# Patient Record
Sex: FEMALE | Race: WHITE | Employment: UNEMPLOYED | URBAN - METROPOLITAN AREA
[De-identification: names, ages, dates, MRNs, and addresses within clinical notes are randomized per-mention and may not be internally consistent; named-entity substitution may affect disease eponyms.]

---
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
## 2021-06-16 ENCOUNTER — HOSPITAL ENCOUNTER (OUTPATIENT)
Dept: PHYSICAL THERAPY | Age: 21
Discharge: HOME | End: 2021-06-16
Payer: COMMERCIAL

## 2021-06-16 PROCEDURE — 97110 THERAPEUTIC EXERCISES: CPT

## 2021-06-16 PROCEDURE — 97530 THERAPEUTIC ACTIVITIES: CPT

## 2021-06-16 PROCEDURE — 97161 PT EVAL LOW COMPLEX 20 MIN: CPT

## 2021-06-16 NOTE — PROGRESS NOTES
PHYSICAL THERAPY - DAILY TREATMENT NOTE    Patient Name: Farideh Kearns        Date: 2021  : 2000   yes Patient  Verified  Visit #:   1     Insurance: Payor: Christie Earing / Plan: Doll Ingles / Product Type: HMO /      In time: 3260 Out time: 6002   Total Treatment Time: 35     Medicare/[facility] Time Tracking (below)   Total Timed Codes (min):  23 1:1 Treatment Time:  35     TREATMENT AREA =  Right elbow pain    SUBJECTIVE  Pain Level (on 0 to 10 scale):  0  / 10   Medication Changes/New allergies or changes in medical history, any new surgeries or procedures?    no  If yes, update Summary List   Subjective Functional Status/Changes:  []  No changes reported   See Eval for subjective information and c/o. OBJECTIVE  10 min Therapeutic Exercise:  [x]  See flow sheet; Kinesiotape- space correction technique applied to the R elbow. Patient was educated on Kinesiotape precautions, indications, contraindications, and instructed to remove if irritation/rash/redness/pain increases or develops. Patient with verbalized understanding of all. Rationale:      increase ROM and increase strength to improve the patients ability to perform activities and decrease pain with movement. 13 min Therapeutic Activity: [x]  See flow sheet  Body part R elbow  Biomechanics of injury; proper sleeping positions using pillows; avoidance of aggravating factors; short lever arm with lifting activities    Rationale:    decrease pressure on the right elbow to  improve the patients ability to Tolerate basic ADLs and job-related tasks without pain.        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 Progress toward goals / Updated goals:  Pt denied sharp pain or red flags with initial eval or therapeutic ex. See initial eval. HEP administered.       PLAN  []  Upgrade activities as tolerated yes Continue plan of care   []  Discharge due to :    []  Other:      Therapist: Bijan Lopez PT    Date: 6/16/2021 Time: 10:34 AM     Future Appointments   Date Time Provider Nabeel Gordon   6/16/2021 11:15 AM [address]

## 2021-06-16 NOTE — PROGRESS NOTES
[facility] PHYSICAL THERAPY  [address] [place] [address] 51, [address],[place], [address] [facility] - Phone: [phone]  Fax: [phone] / [facility]  Patient Name: Howard Stearns : 2000   Medical   Diagnosis: Right elbow pain Treatment Diagnosis: Right elbow pain   Onset Date: 1.5 months     Referral Source: Pedro Fenton Start of [facility]): 2021   Prior Hospitalization: See medical history Provider #: [id number]   Prior Level of Function: No tingling/numbness in 4th/5th digits of the right hand   Comorbidities: Depression, IBS, HTN, asthma   Medications: Verified on Patient Summary List   The Plan of Care and following information is based on the information from the initial evaluation.   ========================================================================  Assessment / key information: Patient is a RHD 22 y.o. female who presents to [facility] with a diagnosis of right elbow pain. Patient is her own historian. 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 Patient presents with chief complaint of tingling/numbness along the right ulnar nerve distribution which is localized to her 4th and 5th digits. Tingling is of sudden onset 1.5 months ago and patient unsure as to PHILL. As per subjective history intake, patient said that she \"sleeps with her right hand under her forehead and/or under her two pillows, my arm is bent. \" Had patient demonstrate position and she was lying in prone with right elbow bent at 90 degrees with hand on forehead. Tingling and numbness in the 4th and 5th digits are greatest upon waking and decrease throughout the day and return the following morning. Tingling also occurs when reading a book while sitting. No c/o weakness/dropping items. No c/o neck pain. Denies radicular s/s from the C-S. Xrays unremarkable.  No evidence of clawing of the 4th/5th digits and no evidence of atrophy at the hypothenar eminence. Current Deficits include: pain, decreased mobility, decreased strength, and decreased postural awareness with resulting limitations in ADL's and in functional abilities. Patient will benefit from a comprehensive POC/HEP to address impairments and restore function in order to return to prior level of function and prevent secondary impairments. Impairments are as follows:   Pain: current pain level 0/10, pain level at worst 2/10, and pain level at best 0/10 TTP along right medial epicondyle  Posture reverse C-S with mild for head and rounded shoulders Observations + increased cubital angle of R elbow>L  AROM/PROM (in degrees unless otherwise specified) C-S, R GH joint, R elbow, and R wrist all WFLs and pain free  Strength grossly 4+/5 throughout RUE  Special Tests + Tinels at the ulnar nerve RUE; + ULTT for R ulnar nerve; DTRs WFLs; RUE myotomes and dermatomes intact  Functional Deficits include: see aforementioned. Patient's FOTO score was a 74/100 indicating decreased function.  Patient will benefit from a POC addressing such impairments and limitations in order to improve quality of life and return to PLOF.    ========================================================================  Eval Complexity: History: HIGH Complexity :3+ comorbidities / personal factors will impact the outcome/ POC Exam:MEDIUM Complexity : 3 Standardized tests and measures addressing body structure, function, activity limitation and / or participation in recreation  Presentation: LOW Complexity : Stable, uncomplicated  Clinical Decision Making:MEDIUM Complexity : FOTO score of 26-74Overall Complexity:LOW   Problem List: pain affecting function, decrease ROM, decrease strength, edema affecting function, impaired gait/ balance, decrease ADL/ functional abilitiies, decrease activity tolerance, decrease flexibility/ joint mobility and decrease transfer abilities   Treatment Plan may include any combination of the following: Therapeutic exercise, Therapeutic activities, Neuromuscular re-education, Physical agent/modality, Gait/balance training, Manual therapy, Aquatic therapy, Patient education, Self Care training, Functional mobility training, Home safety training and Stair training  Patient / Family readiness to learn indicated by: asking questions  Persons(s) to be included in education: patient (P)  Barriers to Learning/Limitations: None  Measures taken: A HEP was initiated to assist with POC in restoring function   Patient Goal (s): \"numbness and tingling to go away\"   Patient self reported health status: good  Rehabilitation Potential: excellent  Short Term Goals: To be accomplished in 2 treatments: 1. Goal: Patient will be independent and compliant with HEP in order to progress toward long term goals. Status at last note/certification: issued and reviewed    Long Term Goals: To be accomplished in 8-12 treatments: 1. Goal: Patient will improve FOTO assessment score to 81 pts in order to indicate improved functional abilities. Status at last note/certification: 74  2. Goal: Patient will demonstrate a negative ULTT for 2 consecutive sessions for the RUE ulnar nerve in order to improve neurodynamic mobility and assist with function         Status at last note/certification: + ULTT   3. Goal: Patient will demonstrate a negative tinels test at the right elbow on 2 consecutive occasions in order to assist with a reduction of nerve entrapment and improve function         Status at last note/certification: + tinels tap test at the right elbow  4. Goal: Patient will report the ability to fall asleep without being in prone, RUE bent at 90 degrees, with hand on forehead, in order to decrease risk of future impairments caused by repetitive entrapment/stress to the ulnar nerve              Status at last note/certification: NA    Frequency / Duration: Patient to be seen  1-2  times per week for 8-12  treatments:  Patient / Caregiver education and instruction: exercises    Therapist Signature: Matheus April, PT Date: 5/87/0675   Certification Period:  Time: 10:33 AM   ========================================================================  I certify that the above Physical Therapy Services are being furnished while the patient is under my care. I agree with the treatment plan and certify that this therapy is necessary. Physician Signature:    _____  Date:     Time:______  Piper Henderson PA-C  Please sign and return to [facility]. or you may fax the signed copy to [phone]. Thank you.

## 2021-06-18 ENCOUNTER — APPOINTMENT (OUTPATIENT)
Dept: PHYSICAL THERAPY | Age: 21
End: 2021-06-18
Payer: COMMERCIAL

## 2021-06-25 ENCOUNTER — HOSPITAL ENCOUNTER (OUTPATIENT)
Dept: PHYSICAL THERAPY | Age: 21
Discharge: HOME | End: 2021-06-25
Payer: COMMERCIAL

## 2021-06-25 PROCEDURE — 97140 MANUAL THERAPY 1/> REGIONS: CPT

## 2021-06-25 PROCEDURE — 97112 NEUROMUSCULAR REEDUCATION: CPT

## 2021-06-25 PROCEDURE — 97110 THERAPEUTIC EXERCISES: CPT

## 2021-06-25 NOTE — PROGRESS NOTES
PHYSICAL THERAPY - DAILY TREATMENT NOTE    Patient Name: Brooks Pappas        Date: 2021  : 2000   yes Patient  Verified  Visit #:   2   of   12  Insurance: Payor: Taylor Roman / Plan: Catherine Howard / Product Type: HMO /      In time: 044 Out time: 1010   Total Treatment Time: 55     Medicare/[employer] Time Tracking (below)   Total Timed Codes (min):  na 1:1 Treatment Time:  na     TREATMENT AREA =  Lesion of ulnar nerve, right upper limb [G56.21]    SUBJECTIVE  Pain Level (on 0 to 10 scale):  0  / 10   Medication Changes/New allergies or changes in medical history, any new surgeries or procedures?    no  If yes, update Summary List   Subjective Functional Status/Changes:  []  No changes reported     Did do the HEP. No questions about the HEP. Reports tingling pin in digits 4 and 5 of (R) hand. OBJECTIVE  Modalities Rationale:     decrease inflammation and decrease pain to improve patient's ability to perform general ADLs with decrease c/o symptoms.    min [] Estim, type/location:                                      []  att     []  unatt     []  w/US     []  w/ice    []  w/heat    min []  Mechanical Traction: type/lbs                   []  pro   []  sup   []  int   []  cont    []  before manual    []  after manual    min []  Ultrasound, settings/location:      min []  Iontophoresis w/ dexamethasone, location:                                               []  take home patch       []  in clinic   10 min [x]  Ice     []  Heat    location/position: R elbow    min []  Vasopneumatic Device, press/temp:    If using vaso (only need to measure limb vaso being performed on)      pre-treatment girth :       post-treatment girth :       measured at (landmark location) :      min []  Other:    [x] Skin assessment post-treatment (if applicable):    [x]  intact    []  redness- no adverse reaction                  []redness  adverse reaction:      25 min Therapeutic Exercise:  [x]  See flow sheet Rationale:      increase ROM and increase strength to improve the patients ability to perform general ADLs with decrease c/o symptoms. 10 min Manual Therapy: MFR/TPR medial head of triceps using extension of wrist, flexor carpi ulnaris using radial deviation to refugio. Rationale:      decrease pain, increase ROM and increase tissue extensibility to improve patient's ability to perform general ADLs with decrease c/o symptoms. The manual therapy interventions were performed at a separate and distinct time from the therapeutic activities interventions. 10 min Neuromuscular Re-ed: [x]  See flow sheet   Rationale:    increase ROM and increase strength to improve the patients ability to perform general ADLs with decrease c/o symptoms. Billed With/As:   [x] TE   [] TA   [] Neuro   [] Self Care Patient Education: [x] Review HEP    [] Progressed/Changed HEP based on:   [] positioning   [] body mechanics   [] transfers   [] heat/ice application    [] other:      Other Objective/Functional Measures:    No change in functional measurements today. Post Treatment Pain Level (on 0 to 10) scale:   5  / 10     ASSESSMENT  Assessment/Changes in Function:     Overall good tolerance to all therapeutic interventions for first treatment session. []  See Progress Note/Recertification   Patient will continue to benefit from skilled PT services to modify and progress therapeutic interventions, address ROM deficits, address strength deficits, analyze and address soft tissue restrictions and analyze and cue movement patterns to attain remaining goals. Progress toward goals / Updated goals:    No change toward goals today.      PLAN  []  Upgrade activities as tolerated yes Continue plan of care   []  Discharge due to :    []  Other:      Therapist: Rachael Tyler PTA    Date: 6/25/2021 Time: 6:11 AM     Future Appointments   Date Time Provider Nabeel Godron   6/25/2021  9:15 AM Sasha Kilpatrick PTA [place] [id number] 6/30/2021 10:30 AM [facility]   7/2/2021 10:15 AM [facility]   7/7/2021 10:15 AM [facility]   7/9/2021 10:15 AM Denis Garcia, [address]

## 2021-06-30 ENCOUNTER — HOSPITAL ENCOUNTER (OUTPATIENT)
Dept: PHYSICAL THERAPY | Age: 21
Discharge: HOME | End: 2021-06-30
Payer: COMMERCIAL

## 2021-06-30 PROCEDURE — 97110 THERAPEUTIC EXERCISES: CPT

## 2021-06-30 PROCEDURE — 97112 NEUROMUSCULAR REEDUCATION: CPT

## 2021-06-30 PROCEDURE — 97140 MANUAL THERAPY 1/> REGIONS: CPT

## 2021-06-30 NOTE — PROGRESS NOTES
PHYSICAL THERAPY - DAILY TREATMENT NOTE    Patient Name: Lenny Rdz        Date: 2021  : 2000   yes Patient  Verified  Visit #:   3   of   12  Insurance: Payor: Suleiman Banegas / Plan: Andreia Oats / Product Type: HMO /      In time: 8655 Out time: 94   Total Treatment Time: 57     Medicare/[employer] Time Tracking (below)   Total Timed Codes (min):  na 1:1 Treatment Time:  na     TREATMENT AREA =  Lesion of ulnar nerve, right upper limb [G56.21]    SUBJECTIVE  Pain Level (on 0 to 10 scale):  1  / 10   Medication Changes/New allergies or changes in medical history, any new surgeries or procedures?    no  If yes, update Summary List   Subjective Functional Status/Changes:  []  No changes reported     Pt continues to have tingling and pain in the 4th and 5th digits of the right hand. She is wearing the right elbow brace during the day, but has pain. She continues to have pain with sleeping and tingling secondary to UE positioning. OBJECTIVE  Modalities Rationale:     decrease inflammation and decrease pain to improve patient's ability to perform general ADLs with decrease c/o symptoms.    min [] Estim, type/location:                                      []  att     []  unatt     []  w/US     []  w/ice    []  w/heat    min []  Mechanical Traction: type/lbs                   []  pro   []  sup   []  int   []  cont    []  before manual    []  after manual    min []  Ultrasound, settings/location:      min []  Iontophoresis w/ dexamethasone, location:                                               []  take home patch       []  in clinic   10 min [x]  Ice     []  Heat    location/position: R elbow    min []  Vasopneumatic Device, press/temp:    If using vaso (only need to measure limb vaso being performed on)      pre-treatment girth :       post-treatment girth :       measured at (landmark location) :      min []  Other:    [x] Skin assessment post-treatment (if applicable):    [x]  intact    [] redness- no adverse reaction                  []redness  adverse reaction:      20 min Therapeutic Exercise:  [x]  See flow sheet Kinesiotape- inhibitory Y technique applied to the R Fleoxr carpi ulnaris; facilitory I to the R triceps; donut hole to the medial epicondyle. Patient was educated on Kinesiotape precautions, indications, contraindications, and instructed to remove if irritation/rash/redness/pain increases or develops. Patient with verbalized understanding of all. Rationale:      increase ROM and increase strength to improve the patients ability to perform general ADLs with decrease c/o symptoms. 15 min Manual Therapy: MFR/TPR to the right pec minor/major, medial head of triceps, flexor carpi ulnaris   Rationale:      decrease pain, increase ROM and increase tissue extensibility to improve patient's ability to perform general ADLs with decrease c/o symptoms. The manual therapy interventions were performed at a separate and distinct time from the therapeutic activities interventions. 12 min Neuromuscular Re-ed: [x]  See flow sheet; RI technique to the right FCU and ulnar nerve glide sequence RUE   Rationale:    increase ROM and increase strength  to improve the patients ability to perform activities with good form and proprioception with tactile and verbal cuing appropriately in order to develop conscious control of individual muscles and awareness of position of extremities. Billed With/As:   [x] TE   [] TA   [] Neuro   [] Self Care Patient Education: [x] Review HEP    [] Progressed/Changed HEP based on:   [] positioning   [] body mechanics   [] transfers   [] heat/ice application    [] other:      Other Objective/Functional Measures:    No change in functional measurements today.      Post Treatment Pain Level (on 0 to 10) scale:   0  / 10     ASSESSMENT  Assessment/Changes in Function:   Pt instructed to don right elbow brace at night when sleeping to avoid sleeping with her R elbow flexed and under her head and to discontinue donning it throughout the day. []  See Progress Note/Recertification   Patient will continue to benefit from skilled PT services to modify and progress therapeutic interventions, address ROM deficits, address strength deficits, analyze and address soft tissue restrictions and analyze and cue movement patterns to attain remaining goals.    Progress toward goals / Updated goals:    Progressing to STG #1     PLAN  [x]  Upgrade activities as tolerated yes Continue plan of care   []  Discharge due to :    []  Other:      Therapist: Cuong Monique PT    Date: 6/30/2021 Time: 6:11 AM     Future Appointments   Date Time Provider Nabeel Gordon   7/2/2021 10:15 AM Ney Imam [facility]   7/7/2021 10:15 AM Shanae Found, [address] [address]   7/9/2021 10:15 AM Shanae Found, PTA [place] [address]

## 2021-07-02 ENCOUNTER — HOSPITAL ENCOUNTER (OUTPATIENT)
Dept: PHYSICAL THERAPY | Age: 21
Discharge: HOME | End: 2021-07-02
Payer: COMMERCIAL

## 2021-07-02 PROCEDURE — 97110 THERAPEUTIC EXERCISES: CPT

## 2021-07-02 PROCEDURE — 97140 MANUAL THERAPY 1/> REGIONS: CPT

## 2021-07-02 NOTE — PROGRESS NOTES
PHYSICAL THERAPY - DAILY TREATMENT NOTE    Patient Name: Emir Atwood        Date: 2021  : 2000   yes Patient  Verified  Visit #:     Insurance: Payor: Estella Enriquez / Plan: Nic Carbon / Product Type: HMO /      In time: 6488 Out time: 1110   Total Treatment Time: 50     Medicare/[employer] Time Tracking (below)   Total Timed Codes (min):  40 1:1 Treatment Time:       TREATMENT AREA =  Lesion of ulnar nerve, right upper limb [G56.21]    SUBJECTIVE  Pain Level (on 0 to 10 scale):  1  / 10   Medication Changes/New allergies or changes in medical history, any new surgeries or procedures?    no  If yes, update Summary List   Subjective Functional Status/Changes:  []  No changes reported     Pt reporting tingling in the (R) hand, but also feels it running down the arm, through the elbow. OBJECTIVE  Modalities Rationale:     decrease inflammation and decrease pain to improve patient's ability to perform pain free ADLs. min [] Estim, type/location:                                      []  att     []  unatt     []  w/US     []  w/ice    []  w/heat    min []  Mechanical Traction: type/lbs                   []  pro   []  sup   []  int   []  cont    []  before manual    []  after manual    min []  Ultrasound, settings/location:      min []  Iontophoresis w/ dexamethasone, location:                                               []  take home patch       []  in clinic   10 min [x]  Ice     []  Heat    location/position: (R) elbow, supine.      min []  Vasopneumatic Device, press/temp:    If using vaso (only need to measure limb vaso being performed on)      pre-treatment girth :       post-treatment girth :       measured at (landmark location) :      min []  Other:    [x] Skin assessment post-treatment (if applicable):    [x]  intact    []  redness- no adverse reaction                  []redness  adverse reaction:      25 min Therapeutic Exercise:  [x]  See flow sheet   Rationale: increase ROM and increase strength to improve the patients ability to perform pain free ADLs. 15 Min Manual Therapy: STM/DTM (R) UT/LS, pec major/min, wrist flexors. Rationale:      decrease pain, increase ROM, increase tissue extensibility and decrease trigger points to improve patient's ability to perform pain free ADLs. The manual therapy interventions were performed at a separate and distinct time from the therapeutic activities interventions. Billed With/As:   [x] TE   [] TA   [] Neuro   [] Self Care Patient Education: [x] Review HEP    [] Progressed/Changed HEP based on:   [] positioning   [] body mechanics   [] transfers   [] heat/ice application    [] other:      Other Objective/Functional Measures: Therex per flow sheet. Post Treatment Pain Level (on 0 to 10) scale:   0  / 10     ASSESSMENT  Assessment/Changes in Function:     Pt reporting no pain, but increase in tingling in the elbow. []  See Progress Note/Recertification   Patient will continue to benefit from skilled PT services to modify and progress therapeutic interventions, address functional mobility deficits, address ROM deficits, address strength deficits, analyze and address soft tissue restrictions, analyze and cue movement patterns, analyze and modify body mechanics/ergonomics and assess and modify postural abnormalities to attain remaining goals. Progress toward goals / Updated goals:    No change in progress toward LTG's with today's session.       PLAN  [x]  Upgrade activities as tolerated yes Continue plan of care   []  Discharge due to :    []  Other:      Therapist: Noralee Landau, PTA    Date: 7/2/2021 Time: 1:33 PM     Future Appointments   Date Time Provider Nabeel Gordon   7/7/2021 10:15 AM Kate Miles [facility]   7/9/2021 10:15 AM Misha Khan PTA [place] [address]

## 2021-07-07 ENCOUNTER — HOSPITAL ENCOUNTER (OUTPATIENT)
Dept: PHYSICAL THERAPY | Age: 21
Discharge: HOME | End: 2021-07-07
Payer: COMMERCIAL

## 2021-07-07 PROCEDURE — 97140 MANUAL THERAPY 1/> REGIONS: CPT

## 2021-07-07 PROCEDURE — 97110 THERAPEUTIC EXERCISES: CPT

## 2021-07-07 NOTE — PROGRESS NOTES
PHYSICAL THERAPY - DAILY TREATMENT NOTE    Patient Name: Lizzy eJtt        Date: 2021  : 2000   yes Patient  Verified  Visit #:     Insurance: Payor: Bettina Peraza / Plan: Kenneth Estrella / Product Type: HMO /      In time:  Out time:    Total Treatment Time: 55     Medicare/[employer]BS Time Tracking (below)   Total Timed Codes (min):  45 1:1 Treatment Time:       TREATMENT AREA =  Lesion of ulnar nerve, right upper limb [G56.21]    SUBJECTIVE  Pain Level (on 0 to 10 scale):  1  / 10   Medication Changes/New allergies or changes in medical history, any new surgeries or procedures?    no  If yes, update Summary List   Subjective Functional Status/Changes:  []  No changes reported     Pt reporting some tingling this am but it has  down significantly. OBJECTIVE  Modalities Rationale:     decrease inflammation and decrease pain to improve patient's ability to perform pain free ADLs. min [] Estim, type/location:                                      []  att     []  unatt     []  w/US     []  w/ice    []  w/heat    min []  Mechanical Traction: type/lbs                   []  pro   []  sup   []  int   []  cont    []  before manual    []  after manual    min []  Ultrasound, settings/location:      min []  Iontophoresis w/ dexamethasone, location:                                               []  take home patch       []  in clinic   10 min [x]  Ice     []  Heat    location/position: (R) elbow in supine.      min []  Vasopneumatic Device, press/temp:    If using vaso (only need to measure limb vaso being performed on)      pre-treatment girth :       post-treatment girth :       measured at (landmark location) :      min []  Other:    [x] Skin assessment post-treatment (if applicable):    [x]  intact    []  redness- no adverse reaction                  []redness  adverse reaction:      30 min Therapeutic Exercise:  [x]  See flow sheet   Rationale:      increase ROM and increase strength to improve the patients ability to perform pain free ADLs. 15 Min Manual Therapy: STM/DTM (R) UT/LS, pec major/min, wrist flexors. Rationale:      decrease pain, increase ROM, increase tissue extensibility and decrease trigger points to improve patient's ability to perform pain free ADLs. The manual therapy interventions were performed at a separate and distinct time from the therapeutic activities interventions. Billed With/As:   [x] TE   [] TA   [] Neuro   [] Self Care Patient Education: [x] Review HEP    [] Progressed/Changed HEP based on:   [] positioning   [] body mechanics   [] transfers   [] heat/ice application    [] other:      Other Objective/Functional Measures: Therex per flow sheet. Post Treatment Pain Level (on 0 to 10) scale:   0  / 10     ASSESSMENT  Assessment/Changes in Function:     No exacerbation of symptoms with today's sessoin. []  See Progress Note/Recertification   Patient will continue to benefit from skilled PT services to modify and progress therapeutic interventions, address functional mobility deficits, address ROM deficits, address strength deficits, analyze and address soft tissue restrictions, analyze and cue movement patterns, analyze and modify body mechanics/ergonomics and assess and modify postural abnormalities to attain remaining goals. Progress toward goals / Updated goals:     No change in progress toward LTG's with today's session.       PLAN  [x]  Upgrade activities as tolerated yes Continue plan of care   []  Discharge due to :    []  Other:      Therapist: Reid Barth PTA    Date: 7/7/2021 Time: 10:21 AM     Future Appointments   Date Time Provider Nabeel Gordon   7/9/2021 10:15 AM Molly Martinez PTA [facility] [address]

## 2021-07-09 ENCOUNTER — HOSPITAL ENCOUNTER (OUTPATIENT)
Dept: PHYSICAL THERAPY | Age: 21
Discharge: HOME | End: 2021-07-09
Payer: COMMERCIAL

## 2021-07-09 PROCEDURE — 97110 THERAPEUTIC EXERCISES: CPT

## 2021-07-09 PROCEDURE — 97140 MANUAL THERAPY 1/> REGIONS: CPT

## 2021-07-09 NOTE — PROGRESS NOTES
PHYSICAL THERAPY - DAILY TREATMENT NOTE    Patient Name: Emir Atwood        Date: 2021  : 2000   yes Patient  Verified  Visit #:     Insurance: Payor: Estella Enriquez / Plan: Nic Carbon / Product Type: HMO /      In time: 278 Out time: 1110   Total Treatment Time: 55     Medicare/[employer]BS Time Tracking (below)   Total Timed Codes (min):  45 1:1 Treatment Time:       TREATMENT AREA =  Lesion of ulnar nerve, right upper limb [G56.21]    SUBJECTIVE  Pain Level (on 0 to 10 scale):  1  / 10   Medication Changes/New allergies or changes in medical history, any new surgeries or procedures?    no  If yes, update Summary List   Subjective Functional Status/Changes:  []  No changes reported     Pt states she had a sharp pain in the elbow yesterday, it didn't travel down to her hand. OBJECTIVE  Modalities Rationale:     decrease inflammation and decrease pain to improve patient's ability to perform pain free ADLs. min [] Estim, type/location:                                      []  att     []  unatt     []  w/US     []  w/ice    []  w/heat    min []  Mechanical Traction: type/lbs                   []  pro   []  sup   []  int   []  cont    []  before manual    []  after manual    min []  Ultrasound, settings/location:      min []  Iontophoresis w/ dexamethasone, location:                                               []  take home patch       []  in clinic   10 min [x]  Ice     []  Heat    location/position: (R) elbow in supine.      min []  Vasopneumatic Device, press/temp:    If using vaso (only need to measure limb vaso being performed on)      pre-treatment girth :       post-treatment girth :       measured at (landmark location) :      min []  Other:    [x] Skin assessment post-treatment (if applicable):    [x]  intact    []  redness- no adverse reaction                  []redness  adverse reaction:      30 min Therapeutic Exercise:  [x]  See flow sheet   Rationale:      increase ROM and increase strength to improve the patients ability to perform pain free ADLs. 15 Min Manual Therapy: STM/DTM (R) UT/LS, pec major/min, wrist flexors. KT tape - inhibitory Y (R) flexor carpi ulnaris, facilitory I to (R) triceps, donut hole to medial epicondyle. Rationale:      decrease pain, increase ROM, increase tissue extensibility and decrease trigger points to improve patient's ability to perform pain free ADLs. The manual therapy interventions were performed at a separate and distinct time from the therapeutic activities interventions. Billed With/As:   [x] TE   [] TA   [] Neuro   [] Self Care Patient Education: [x] Review HEP    [] Progressed/Changed HEP based on:   [] positioning   [] body mechanics   [] transfers   [] heat/ice application    [] other:      Other Objective/Functional Measures: Therex per flow sheet. Post Treatment Pain Level (on 0 to 10) scale:   0  / 10     ASSESSMENT  Assessment/Changes in Function:     No exacerbation of symptoms with today's session. []  See Progress Note/Recertification   Patient will continue to benefit from skilled PT services to modify and progress therapeutic interventions, address functional mobility deficits, address ROM deficits, address strength deficits, analyze and address soft tissue restrictions, analyze and cue movement patterns, analyze and modify body mechanics/ergonomics and assess and modify postural abnormalities to attain remaining goals. Progress toward goals / Updated goals:    No change in progress toward LTG's with today's session. PLAN  [x]  Upgrade activities as tolerated yes Continue plan of care   []  Discharge due to :    []  Other:      Therapist: Ishaan Torres PTA    Date: 7/9/2021 Time: 10:32 AM     No future appointments.

## 2021-10-13 NOTE — PROGRESS NOTES
[address] [address] [facility]   [facility] [address], [address], [place], [address] [facility] - Phone: [phone]  Fax: [phone]  DISCHARGE SUMMARY    Patient Name: Dior Leiva : 2000   Treatment/Medical Diagnosis: Lesion of ulnar nerve, right upper limb [G56.21]   Referral Source: Jose Molina PA-C     Date of Initial Visit: 21 Attended Visits: 6 Missed Visits: 0   PROVIDER# [id number]    SUMMARY OF TREATMENT  Skilled physical therapy was provided to the patient and/or family members inclusive of: postural re-education, pain management and symptom reduction using modalities, NM re-education, therapeutic exercise and therapeutic activities. A focus was on implementation of a comprehensive HEP in order to aid in a return to PLOF. CURRENT STATUS  Unable to follow up with patient regarding goals secondary to patient not returning after 21. As per PTA's note on 21, patient had been making good progress to date and with less tingling. RECOMMENDATIONS  Discontinue therapy due to lack of attendance or compliance. If you have any questions/comments please contact us directly at [phone]. Thank you for allowing us to assist in the care of your patient.     Therapist Signature: Jerrell Copeland PT Date:    Certification Period:    Reporting Period: MEDICARE ONLY        Time: 3:20 PM

## 2022-07-29 ENCOUNTER — HOSPITAL ENCOUNTER (OUTPATIENT)
Dept: PHYSICAL THERAPY | Age: 22
Discharge: HOME | End: 2022-07-29
Payer: COMMERCIAL

## 2022-07-29 PROCEDURE — 97161 PT EVAL LOW COMPLEX 20 MIN: CPT

## 2022-07-29 PROCEDURE — 97110 THERAPEUTIC EXERCISES: CPT

## 2022-07-29 PROCEDURE — 97535 SELF CARE MNGMENT TRAINING: CPT

## 2022-07-29 NOTE — PROGRESS NOTES
[facility] PHYSICAL THERAPY  [address] [place] [address], [address],[place], [address] [facility] - Phone: [phone]  Fax: [phone] / [address] [facility]  Patient Name: Dragan Randle : 2000   Medical   Diagnosis: Right knee pain [M25.561] Treatment Diagnosis: R knee pain   Onset Date: 7/15/2022     Referral Source: [facility] Start of Care [facility]): 2022   Prior Hospitalization: See medical history Provider #: [id number]   Prior Level of Function: IND with all ADLs   Comorbidities: Ulnar nerve release , depression   Medications: Verified on Patient Summary List   The Plan of Care and following information is based on the information from the initial evaluation.   ===========================================================================================  Assessment / key information:  Dragan Randle is a 25 y.o. F who presents to skilled PT for the treatment diagnosis of R knee pain. PHILL was jumping at a concert, she jumped and she landed and felt a pop and immediately knew something was wrong. Pt had to be escorted out via ambulance. it swelled a lot, and could not walk on it immediately. Still having trouble walking normally. Notices some ROM restriction with some popping with pain when bending and straightening. Feels best in the morning. Once she gets moving it starts feeling sore. Also notices some calf soreness due to walking. Has been doing one stair at a time. Tried doing it normally and has to swing out to the side. Pt does childcare. Her getting on the floor and playing with the kids is affected. Pt would benefit form skilled PT services addressing the current ROM, strength, functional performance, and balance impairments so that the patient to return to performing all ADLs with minimal restriction/limitation or without any functional limitations.      Pain:  Current: 2/10    Worst: 9/10 initial injury   Best: 1/10    Objective:   FOTO score = 36 (an established functional score where 100 = no disability)    Posture: supine with R LE bent     Swelling/Edema girth measurements (at joint line):   (L) knee - NT  (R) knee - NT    Functional Activity:  Sit to stand - raised table slight weight shift to L knee  Squat - NT    Knee AROM   (L) Flexion: 135 deg     Extension: 3 hyperext                 (R) Flexion: 105 deg   Extension: -10 deg    Knee PROM  R flexion 110 deg no p!  R ext -5 deg      Knee MMT  (L) Flexion: 5/5    Extension: 5/5  (R) Flexion: 4/5    Extension: 5/5    HEP provided to the patient in order to promote improvement and self management of symptoms and functional performance:   Access Code: [email or web address]  URL: [email or web address]  Date: 07/29/2022  Prepared by: Zuhair Grissom    Exercises  Supine Heel Slide with Strap - 2 x daily - 7 x weekly - 10 reps - 5\" hold  Supine Quad Set - 4 x daily - 7 x weekly - 10 reps - 3\" hold  Long Sitting Calf Stretch with Strap - 2 x daily - 7 x weekly - 3 sets - 30\" hold  Bridge on Heels - 1 x daily - 7 x weekly - 3 sets - 10 reps        ===========================================================================================  Eval Complexity: History LOW Complexity : Zero comorbidities / personal factors that will impact the outcome / POC;  Examination  LOW Complexity : 1-2 Standardized tests and measures addressing body structure, function, activity limitation and / or participation in recreation ; Presentation LOW Complexity : Stable, uncomplicated ;  Decision Making MEDIUM Complexity : FOTO score of 26-74; Overall Complexity LOW   Problem List: pain affecting function, decrease ROM, decrease strength, edema affecting function, impaired gait/ balance, decrease ADL/ functional abilitiies, decrease activity tolerance, and decrease flexibility/ joint mobility   Treatment Plan may include any combination of the following: Therapeutic exercise, Therapeutic activities, Neuromuscular re-education, Physical agent/modality, Gait/balance training, Manual therapy, Patient education, Self Care training, Functional mobility training, Home safety training, and Stair training  Patient / Family readiness to learn indicated by: asking questions, trying to perform skills, and interest  Persons(s) to be included in education: patient (P)  Barriers to Learning/Limitations: None  Measures taken, if barriers to learning:    Patient Goal (s): \"Strength, better ROM, stability, and no surgery if possible\"   Patient self reported health status: fair  Rehabilitation Potential: good  Short Term Goals: To be accomplished in  2  weeks. 1) Pt will be IND with HEP to facilitate self care management. 2) Pt will improve L knee ROM to 0-130 deg to order to improve ability to improve ability to negotiate stairs with ease   Long Term Goals: To be accomplished in  6 weeks. 1) Pt will improve worst pain levels from initial evaluation level of 9/10 to 2/10 to show improved QOL and improved overall perception of pain-free/more pain-free function with ADLs. 2) Pt will improve FOTO scores to 68 in order to show detectable change in overall function. 3) Pt will be able to perform stair negotiation with </= 3/10 (within tolerable limits) signifying improvement in overall functional capacity and activity tolerance. Frequency / Duration:   Patient to be seen  2  times per week for 6  weeks. Patient / Caregiver education and instruction: self care, activity modification, and exercises  Therapist Signature: Magaly Mock PT Date: 8/07/1301   Certification Period: NA Time: 7:43 AM   ===========================================================================================  I certify that the above Physical Therapy Services are being furnished while the patient is under my care.   I agree with the treatment plan and certify that this therapy is necessary. Physician Signature:        Date:       Time:                                        Fredy Norman,*  Please sign and return to [facility] at [facility] [place], [facility]. or you may fax the signed copy to [phone]. Thank you.

## 2022-07-29 NOTE — PROGRESS NOTES
PHYSICAL THERAPY - DAILY TREATMENT NOTE    Patient Name: Dragan Randle        Date: 2022  : 2000   YES Patient  Verified  Visit #:   1   of   8  Insurance: Payor: Aishwarya Bkaer / Plan: Debi Ken / Product Type: HMO /      In time: 520 Out time: 3630   Total Treatment Time: 40     Medicare/[facility] Time Tracking (below)   Total Timed Codes (min):  25 1:1 Treatment Time:  40     TREATMENT AREA =  Right knee pain [M25.561]    SUBJECTIVE    Pain Level (on 0 to 10 scale):  2  / 10   Medication Changes/New allergies or changes in medical history, any new surgeries or procedures? NO    If yes, update Summary List   Subjective Functional Status/Changes:  []  No changes reported     See POC          OBJECTIVE  10 min Therapeutic Exercise:  [x]  See flow sheet   Rationale:      increase ROM, increase strength, improve coordination, improve balance, and increase proprioception to improve the patients ability to perform ADLs with decreased symptoms and limitations. 15 min Self Care: Reviewed diagnosis, prognosis, therapy progression  Discussion of ACL surgery and rehab progression and pros and cons of surgery   Rationale:    Improve understanding of injury and therapy to have realistic expectation of therapy to improve compliance/adherence and satisfaction    Billed With/As:   [x] TE   [] TA   [] Neuro   [x] Self Care Patient Education: [x] Review HEP    [] Progressed/Changed HEP based on:   Access Code: [email or web address]  URL: [email or web address]  Date: 2022  Prepared by: Rodena Plate    Exercises  Supine Heel Slide with Strap - 2 x daily - 7 x weekly - 10 reps - 5\" hold  Supine Quad Set - 4 x daily - 7 x weekly - 10 reps - 3\" hold  Long Sitting Calf Stretch with Strap - 2 x daily - 7 x weekly - 3 sets - 30\" hold  Bridge on Heels - 1 x daily - 7 x weekly - 3 sets - 10 reps    [] positioning   [] body mechanics   [] transfers   [x] heat/ice application    [] other: Other Objective/Functional Measures:    Shown and performed HEP     Post Treatment Pain Level (on 0 to 10) scale:   1 / 10     ASSESSMENT  Assessment/Changes in Function:     See POC     []  See Progress Note/Recertification   Patient will continue to benefit from skilled PT services to modify and progress therapeutic interventions, address functional mobility deficits, address ROM deficits, address strength deficits, analyze and address soft tissue restrictions, analyze and cue movement patterns, analyze and modify body mechanics/ergonomics, assess and modify postural abnormalities, and instruct in home and community integration to attain goals per POC.    Progress toward goals / Updated goals:    See POC     PLAN  [x]  Upgrade activities as tolerated YES Continue plan of care   []  Discharge due to :    [x]  Other: 2x per week for 4 weeks     Therapist: Irma Randle PT    Date: 7/29/2022 Time: 7:44 AM

## 2022-08-01 ENCOUNTER — HOSPITAL ENCOUNTER (OUTPATIENT)
Dept: PHYSICAL THERAPY | Age: 22
Discharge: HOME | End: 2022-08-01
Payer: COMMERCIAL

## 2022-08-01 PROCEDURE — 97140 MANUAL THERAPY 1/> REGIONS: CPT

## 2022-08-01 PROCEDURE — 97110 THERAPEUTIC EXERCISES: CPT

## 2022-08-01 NOTE — PROGRESS NOTES
PHYSICAL THERAPY - DAILY TREATMENT NOTE    Patient Name: Carter Allen        Date: 2022  : 2000   yes Patient  Verified  Visit #:   2   of   8  Insurance: Payor: Seferino Yates / Plan: Bridget Nogueira / Product Type: HMO /      In time:  Out time:    Total Treatment Time: 43     Medicare/BCBS Time Tracking (below)   Total Timed Codes (min):  40 1:1 Treatment Time:  43     TREATMENT AREA =  Right knee pain [M25.561]    SUBJECTIVE  Pain Level (on 0 to 10 scale):  0  / 10   Medication Changes/New allergies or changes in medical history, any new surgeries or procedures?    no  If yes, update Summary List   Subjective Functional Status/Changes:  []  No changes reported     Pt reports the initial exercises / HEP have helped her a lot. Feels her ROM is better, however still has pain with straightening and bending. Pt states she can go up stairs one after the other, however one at a time with going down. OBJECTIVE    28 min Therapeutic Exercise:  [x]  See flow sheet   Rationale:      increase ROM and increase strength to improve the patients ability to perform ADL's and functional tasks      10 min Manual Therapy: STM to quad, ITB and gastroc in long sit   Patella mobilizations   Quad stretch in Reilly Padmini position c PT    Rationale:      decrease pain, increase ROM, and increase tissue extensibility to improve patient's ability to perform functional tasks and ADL's  The manual therapy interventions were performed at a separate and distinct time from the therapeutic activities interventions.     5 min Self Care: Continued  on diagnosis, prognosis and importance of regaining strength, ROM and gait mechanics (not billed)   Rationale:     Patient education  to improve the patients ability to self manage symptoms     Billed With/As:   [x] TE   [] TA   [] Neuro   [] Self Care Patient Education: [x] Review HEP    [] Progressed/Changed HEP based on:   [] positioning   [] body mechanics   [] transfers   [] heat/ice application    [] other:      Other Objective/Functional Measures:    Achieved 0-112deg R knee extension today, pt hesitant due to feeling of instability (R knee flexion improved following quad stretching in Penryn Prima position c PT)    Decent gait mechanics, demos ability to ascend stairs in reciprocal fashion, continues step to with descent    Increased exercises per flow sheet      Post Treatment Pain Level (on 0 to 10) scale:   2  / 10     ASSESSMENT  Assessment/Changes in Function:     Pt faired well during session today, improved R knee ROM following manuals and stretching. Reports decreased tightness during ambulation at end of session. Decent TRX STS mechanics today with minimal weight shift to L LE. Pt fatigued at end of session, declined need for CP. Will continue to benefit from skilled PT to address deficits with progressions in strengthening and [employer] tasks as able. []  See Progress Note/Recertification   Patient will continue to benefit from skilled PT services to modify and progress therapeutic interventions, address functional mobility deficits, address ROM deficits, address strength deficits, analyze and address soft tissue restrictions, analyze and cue movement patterns, analyze and modify body mechanics/ergonomics, and assess and modify postural abnormalities to attain remaining goals.    Progress toward goals / Updated goals:    Initiated, first follow up since IE     PLAN  [x]  Upgrade activities as tolerated yes Continue plan of care   []  Discharge due to :    []  Other:      Therapist: Yue Almonte PT    Date: 8/1/2022 Time: 10:05 AM     Future Appointments   Date Time Provider Nabeel Gordon   8/4/2022 11:15 AM Vincent Alfonso, PT [facility]   8/8/2022  3:00 PM Emile Ngo PT [facility]   8/11/2022 11:00 AM Emile Ngo PT [facility]   8/15/2022  2:15 PM Emile Ngo, [address] [facility]   8/18/2022  8:30 AM Vincent Alfonso, [address] [facility] 8/22/2022  7:00 AM Geofm Foot, [address] [facility]   8/24/2022 10:30 AM Abel Argueta PT [facility]

## 2022-08-04 ENCOUNTER — HOSPITAL ENCOUNTER (OUTPATIENT)
Dept: PHYSICAL THERAPY | Age: 22
Discharge: HOME | End: 2022-08-04
Payer: COMMERCIAL

## 2022-08-04 PROCEDURE — 97535 SELF CARE MNGMENT TRAINING: CPT

## 2022-08-04 PROCEDURE — 97140 MANUAL THERAPY 1/> REGIONS: CPT

## 2022-08-04 PROCEDURE — 97110 THERAPEUTIC EXERCISES: CPT

## 2022-08-04 NOTE — PROGRESS NOTES
PHYSICAL THERAPY - DAILY TREATMENT NOTE    Patient Name: Paola Adams        Date: 2022  : 2000   yes Patient  Verified  Visit #:   3   of   8  Insurance: Payor: Garcia Hitchcock / Plan: [employer] / Product Type: HMO /      In time: 1120 Out time:    Total Treatment Time: 55     Medicare/BCBS Time Tracking (below)   Total Timed Codes (min):  45 1:1 Treatment Time:  55     TREATMENT AREA =  Right knee pain [M25.561]    SUBJECTIVE  Pain Level (on 0 to 10 scale):  3  / 10   Medication Changes/New allergies or changes in medical history, any new surgeries or procedures?    no  If yes, update Summary List   Subjective Functional Status/Changes:  []  No changes reported     Pt states she did well after last session, had some soreness however noticed her knee overall felt better with more ROM. Pt has been shopping already today and walking a lot. Has increased her stair attempts, which she feels has made her more sore.            OBJECTIVE  Modalities Rationale:     decrease edema and decrease pain to improve patient's ability to tolerate standing tasks    min [] Estim, type/location:                                      []  att     []  unatt     []  w/US     []  w/ice    []  w/heat    min []  Mechanical Traction: type/lbs                   []  pro   []  sup   []  int   []  cont    []  before manual    []  after manual    min []  Ultrasound, settings/location:      min []  Iontophoresis w/ dexamethasone, location:                                               []  take home patch       []  in clinic   10 min [x]  Ice     []  Heat    location/position: R knee in long sit with towel roll under knee    min []  Vasopneumatic Device, press/temp:    If using vaso (only need to measure limb vaso being performed on)      pre-treatment girth :       post-treatment girth :       measured at (landmark location) :      min []  Other:    [x] Skin assessment post-treatment (if applicable):    [x]  intact    [] redness- no adverse reaction                  []redness - adverse reaction:      20 min Therapeutic Exercise:  [x]  See flow sheet   Rationale:      increase ROM and increase strength to improve the patients ability to perform ADL's and functional tasks      15 min Manual Therapy: STM to quad, ITB and gastroc in long sit  Patella mobilizations  Quad stretch in Jes Guess position c PT   Gastroc stretch    Rationale:      decrease pain, increase ROM, and increase tissue extensibility to improve patient's ability to perform ADL's and functional tasks   The manual therapy interventions were performed at a separate and distinct time from the therapeutic activities interventions. 10 min Self Care: Pt counseled on PT expectations, progress and prognosis. Continued discussion on potential surgical intervention vs. Conservative care. Included hamstring stretching to HEP with print out. Rationale:     Patient education  to improve the patients ability to self manage symptoms     Billed With/As:   [] TE   [] TA   [] Neuro   [x] Self Care Patient Education: [x] Review HEP    [] Progressed/Changed HEP based on:   [] positioning   [] body mechanics   [] transfers   [] heat/ice application    [] other:      Other Objective/Functional Measures:    Pt demos decreased terminal knee extension during stance phases of gait at beginning of session, improved following Rx. Increased TTP to quad, gastroc and hamstring today    Included hamstring stretching and calf raises today without adverse reactions    Updated HEP to include hamstring stretching      Post Treatment Pain Level (on 0 to 10) scale:   0  / 10     ASSESSMENT  Assessment/Changes in Function:     Slightly modified session today due to pt increased reports of soreness following increased standing / ambulation tasks outside of clinic. Continues to demo hesitancy toward end range with R knee.  Improved gait mechanics at end of session, demo'ing improved terminal knee extension during stance phase. CP at end of session to reduce post exercise soreness. Will continue to benefit from skilled PT to address deficits with progressions in strengthening and [employer] tasks as able. []  See Progress Note/Recertification   Patient will continue to benefit from skilled PT services to modify and progress therapeutic interventions, address functional mobility deficits, address ROM deficits, address strength deficits, analyze and address soft tissue restrictions, analyze and cue movement patterns, and analyze and modify body mechanics/ergonomics to attain remaining goals. Progress toward goals / Updated goals:    Short Term Goals: To be accomplished in  2  weeks. 1) Pt will be IND with HEP to facilitate self care management. - Progressing, Pt reports compliance with initial HEP (8/4/22)  2) Pt will improve L knee ROM to 0-130 deg to order to improve ability to improve ability to negotiate stairs with ease   Long Term Goals: To be accomplished in  6 weeks. 1) Pt will improve worst pain levels from initial evaluation level of 9/10 to 2/10 to show improved QOL and improved overall perception of pain-free/more pain-free function with ADLs. 2) Pt will improve FOTO scores to 68 in order to show detectable change in overall function. 3) Pt will be able to perform stair negotiation with </= 3/10 (within tolerable limits) signifying improvement in overall functional capacity and activity tolerance.      PLAN  [x]  Upgrade activities as tolerated yes Continue plan of care   []  Discharge due to :    []  Other:      Therapist: Feliciana Councilman, PT    Date: 8/4/2022 Time: 11:20 AM     Future Appointments   Date Time Provider Nabeel Gordon   8/8/2022  3:00 PM Gabriele Stephenson PT [facility]   8/11/2022 11:00 AM Gabriele Stephenson PT [facility]   8/15/2022  2:15 PM Gabriele Stephenson PT [facility]   8/18/2022  8:30 AM Allen Chavarria, [address] [place] [address] [facility]   8/22/2022  7:00 AM Allen Chavarria, PT [facility]   8/24/2022 10:30 AM Maykel Gamez, PT [facility]

## 2022-08-08 ENCOUNTER — HOSPITAL ENCOUNTER (OUTPATIENT)
Dept: PHYSICAL THERAPY | Age: 22
Discharge: HOME | End: 2022-08-08
Payer: COMMERCIAL

## 2022-08-08 PROCEDURE — 97112 NEUROMUSCULAR REEDUCATION: CPT

## 2022-08-08 PROCEDURE — 97110 THERAPEUTIC EXERCISES: CPT

## 2022-08-08 PROCEDURE — 97140 MANUAL THERAPY 1/> REGIONS: CPT

## 2022-08-08 NOTE — PROGRESS NOTES
PHYSICAL THERAPY - DAILY TREATMENT NOTE    Patient Name: Eliot Bolden        Date: 2022  : 2000   yes Patient  Verified  Visit #:   4   of   8  Insurance: Payor: Layla Sood / Plan: Venkata Romero / Product Type: HMO /      In time: 3:09 PM Out time: 3:57 PM   Total Treatment Time: 48     Medicare/BCBS Time Tracking (below)   Total Timed Codes (min):  38 1:1 Treatment Time:  48     TREATMENT AREA =  Right knee pain [M25.561]    SUBJECTIVE  Pain Level (on 0 to 10 scale):  4  / 10   Medication Changes/New allergies or changes in medical history, any new surgeries or procedures?    no  If yes, update Summary List   Subjective Functional Status/Changes:  []  No changes reported     Patient reports the knee has been hurting more. She will be walking it can feel like it alix and when that happened it hurts. Also feels some grinding in the knee which can be painful. Soreness in the calf and the quad area. OBJECTIVE  10 min Therapeutic Exercise:  [x]  See flow sheet   Rationale:      increase ROM, increase strength, improve coordination, improve balance, and increase proprioception to improve the patients ability to perform general ADLs with decrease c/o symptoms and with improved functional performance. 15 min Manual Therapy: STM to HS, calf/knee ext mobs gr 2-3    Rationale:      decrease pain, increase ROM, increase tissue extensibility, decrease edema , and decrease trigger points to improve patient's ability to perform general ADLs with decrease c/o symptoms and with improved functional performance. The manual therapy interventions were performed at a separate and distinct time from the therapeutic activities interventions.     13 min Neuromuscular Re-ed: [x]  See flow sheet   Rationale:    increase ROM, increase strength, improve coordination, improve balance, and increase proprioception to improve the patients ability to perform general ADLs with decrease c/o symptoms and with improved functional performance. Billed With/As:   [x] TE   [] TA   [] Neuro   [] Self Care Patient Education: [x] Review HEP    [] Progressed/Changed HEP based on:   [] positioning   [] body mechanics   [] transfers   [] heat/ice application    [] other:      Other Objective/Functional Measures:    R knee AROM   deg      Post Treatment Pain Level (on 0 to 10) scale:   0  / 10     ASSESSMENT  Assessment/Changes in Function:     Patient tolerated today's treatment very well. Initially had poor tolerance for extension. After manual, much improved mobility to -2 deg ext. Reinforced with TKE strengthening and re edu via quad setting, SLR, and band TKE. Initiated SLS todayon airex. Good tolerance but decreased stability on R knee requiring UE support to prevent LOB . []  See Progress Note/Recertification   Patient will continue to benefit from skilled PT services to modify and progress therapeutic interventions, address functional mobility deficits, address ROM deficits, address strength deficits, analyze and address soft tissue restrictions, analyze and cue movement patterns, analyze and modify body mechanics/ergonomics, assess and modify postural abnormalities, and instruct in home and community integration to attain remaining goals. Progress toward goals / Updated goals:    Patient is making slow progress towards their goals at this time. Her pain levels are fluctuating from 0-4/10 and ytoday was worse but was able to get back to a 0/10. Variable progress to LTG for pain. All goals remain applicable at this time.      PLAN  [x]  Upgrade activities as tolerated yes Continue plan of care   []  Discharge due to :    []  Other:      Therapist: Magaly Mock PT    Date: 8/8/2022 Time: 10:27 AM     Future Appointments   Date Time Provider Nabeel Gordon   8/8/2022  3:00 PM Milton Quevedo PT [facility]   8/11/2022 11:00 AM [facility]   8/15/2022  2:15 PM Erendira Dawkins [place] [facility]   8/18/2022  8:30 AM Elo Moncada, [facility]   8/22/2022  7:00 AM Elo Moncada, PT [facility]   8/24/2022 10:30 AM Holly Rivera, PT [facility]

## 2022-08-11 ENCOUNTER — HOSPITAL ENCOUNTER (OUTPATIENT)
Dept: PHYSICAL THERAPY | Age: 22
Discharge: HOME | End: 2022-08-11
Payer: COMMERCIAL

## 2022-08-11 PROCEDURE — 97110 THERAPEUTIC EXERCISES: CPT

## 2022-08-11 PROCEDURE — 97112 NEUROMUSCULAR REEDUCATION: CPT

## 2022-08-11 PROCEDURE — 97140 MANUAL THERAPY 1/> REGIONS: CPT

## 2022-08-11 NOTE — PROGRESS NOTES
PHYSICAL THERAPY - DAILY TREATMENT NOTE    Patient Name: Pramod Gonzales        Date: 2022  : 2000   yes Patient  Verified  Visit #:   5   of   8  Insurance: Payor: Yudith Paiz / Plan: Ismael Apple / Product Type: HMO /      In time: 1100  Out time: 1200   Total Treatment Time: 50     Medicare/BCBS Time Tracking (below)   Total Timed Codes (min):  50 1:1 Treatment Time:  60     TREATMENT AREA =  Right knee pain [M25.561]    SUBJECTIVE  Pain Level (on 0 to 10 scale):  6  / 10   Medication Changes/New allergies or changes in medical history, any new surgeries or procedures?    no  If yes, update Summary List   Subjective Functional Status/Changes:  []  No changes reported     Patient reports that it buckled this morning amd it got very irritated. Afte rthe buckel the knee and calf are very sore. Yesterday it was very good. OBJECTIVE  15 min Therapeutic Exercise:  [x]  See flow sheet   Rationale:      increase ROM, increase strength, improve coordination, improve balance, and increase proprioception to improve the patients ability to perform general ADLs with decrease c/o symptoms and with improved functional performance. 20 min Manual Therapy: STM to HS, calf/knee ext mobs gr 2-3    Rationale:      decrease pain, increase ROM, increase tissue extensibility, decrease edema , and decrease trigger points to improve patient's ability to perform general ADLs with decrease c/o symptoms and with improved functional performance. The manual therapy interventions were performed at a separate and distinct time from the therapeutic activities interventions. 15 min Neuromuscular Re-ed: [x]  See flow sheet   Rationale:    increase ROM, increase strength, improve coordination, improve balance, and increase proprioception to improve the patients ability to perform general ADLs with decrease c/o symptoms and with improved functional performance.     Billed With/As:   [x] TE   [] TA   [] Neuro [] Self Care Patient Education: [x] Review HEP    Discussed adding CR to HEP  [] Progressed/Changed HEP based on:   [] positioning   [] body mechanics   [] transfers   [] heat/ice application    [] other:      Other Objective/Functional Measures:    R knee AROM   Ext -6 deg p! Improved  to -1 deg      Post Treatment Pain Level (on 0 to 10) scale:   2 / 10     ASSESSMENT  Assessment/Changes in Function:     Patient tolerated today's treatment well today. Still extension intolerance due to lateral knee pain. Better post manual and HS and calf stretching. Fatgied with SLR but quad setting today. Pt reported to hyperxtension knee buckling. Adding additional knee flexion control exercises today which she tolerated very well. Instructed to focus on bridges on heels and add CR to HEP to assist with knee flexion control      []  See Progress Note/Recertification   Patient will continue to benefit from skilled PT services to modify and progress therapeutic interventions, address functional mobility deficits, address ROM deficits, address strength deficits, analyze and address soft tissue restrictions, analyze and cue movement patterns, analyze and modify body mechanics/ergonomics, assess and modify postural abnormalities, and instruct in home and community integration to attain remaining goals. Progress toward goals / Updated goals:    Patient is making slow progress towards their goals at this time. Knee extension still lagging in mobility but improves post manual and stretching, and still limited ambulatory ability with buckling in knee via hyperextension. . All goals remain applicable at this time.      PLAN  [x]  Upgrade activities as tolerated yes Continue plan of care   []  Discharge due to :    []  Other:      Therapist: Kira Velázquez PT    Date: 8/11/2022 Time: 8:30 AM     Future Appointments   Date Time Provider Nabeel Gordon   8/11/2022 11:00 AM Tc Beyer PT [facility]   8/15/2022  2:15 PM Katie Alejandra, [facility]   8/18/2022  8:30 AM Nathanael Lin, [address] [facility]   8/22/2022  7:00 AM Nathanael Lin, [address] [facility]   8/24/2022 10:30 AM Katie Alejandra, [facility]

## 2022-08-15 ENCOUNTER — HOSPITAL ENCOUNTER (OUTPATIENT)
Dept: PHYSICAL THERAPY | Age: 22
Discharge: HOME | End: 2022-08-15
Payer: COMMERCIAL

## 2022-08-15 PROCEDURE — 97112 NEUROMUSCULAR REEDUCATION: CPT

## 2022-08-15 PROCEDURE — 97110 THERAPEUTIC EXERCISES: CPT

## 2022-08-15 PROCEDURE — 97140 MANUAL THERAPY 1/> REGIONS: CPT

## 2022-08-15 NOTE — PROGRESS NOTES
PHYSICAL THERAPY - DAILY TREATMENT NOTE    Patient Name: Hugh Kaufman        Date: 8/15/2022  : 2000   yes Patient  Verified  Visit #:   6   of   8  Insurance: Payor: Arely Hussein / Plan: Mathew López / Product Type: HMO /      In time: 2:15 Out time: 3:12   Total Treatment Time: 57     Medicare/BCBS Time Tracking (below)   Total Timed Codes (min):  47 1:1 Treatment Time:  57     TREATMENT AREA =  Right knee pain [M25.561]    SUBJECTIVE  Pain Level (on 0 to 10 scale):  0  / 10   Medication Changes/New allergies or changes in medical history, any new surgeries or procedures?    no  If yes, update Summary List   Subjective Functional Status/Changes:  []  No changes reported     Patient reports the knee is not hurting today. Was more careful over the weekend and that helped with the buckling of the knee. She did have some pain rolling over in bed and got a very sharp pain which lasted for like a minute and it went away. Also notes some sharp pain in the lower calf over the weekend. OBJECTIVE  Modalities Rationale:     decrease edema, decrease inflammation, and decrease pain to improve patient's ability to perform ADLs with decreased pain and improved mobility.    min [] Estim, type/location:                                      []  att     []  unatt     []  w/US     []  w/ice    []  w/heat    min []  Mechanical Traction: type/lbs                   []  pro   []  sup   []  int   []  cont    []  before manual    []  after manual    min []  Ultrasound, settings/location:      min []  Iontophoresis w/ dexamethasone, location:                                               []  take home patch       []  in clinic   10 min [x]  Ice     []  Heat    location/position: On incline R knee    min []  Vasopneumatic Device, press/temp:    If using vaso (only need to measure limb vaso being performed on)      pre-treatment girth :       post-treatment girth :       measured at (landmark location) :      min [] Other:    [] Skin assessment post-treatment (if applicable):    []  intact    []  redness- no adverse reaction                  []redness - adverse reaction:      10 min Therapeutic Exercise:  [x]  See flow sheet   Rationale:      increase ROM, increase strength, improve coordination, improve balance, and increase proprioception to improve the patients ability to perform general ADLs with decrease c/o symptoms and with improved functional performance. 20 min Manual Therapy: STM to HS, calf/knee ext mobs gr 2-3    Rationale:      decrease pain, increase ROM, increase tissue extensibility, decrease edema , and decrease trigger points to improve patient's ability to perform general ADLs with decrease c/o symptoms and with improved functional performance. The manual therapy interventions were performed at a separate and distinct time from the therapeutic activities interventions. 17 min Neuromuscular Re-ed: [x]  See flow sheet   Rationale:    increase ROM, increase strength, improve coordination, improve balance, and increase proprioception to improve the patients ability to perform general ADLs with decrease c/o symptoms and with improved functional performance. Billed With/As:   [x] TE   [] TA   [] Neuro   [] Self Care Patient Education: [x] Review HEP    [] Progressed/Changed HEP based on:   [] positioning   [] body mechanics   [] transfers   [] heat/ice application    [] other:      Other Objective/Functional Measures:    R knee AROM   Extension -2 deg slight pain     Post tx ext 0 deg      Post Treatment Pain Level (on 0 to 10) scale:   1  / 10     ASSESSMENT  Assessment/Changes in Function:     Patient tolerated today's treatment well. Better extension mobility at the start of the session. Progressed her CKC activity today which was tolerable. Most pain and difficulty with TKE moblity and strengthening. Did well with the eccentric step downs backwards with good control and obvious fatigue.       [] See Progress Note/Recertification   Patient will continue to benefit from skilled PT services to modify and progress therapeutic interventions, address functional mobility deficits, address ROM deficits, address strength deficits, analyze and address soft tissue restrictions, analyze and cue movement patterns, analyze and modify body mechanics/ergonomics, assess and modify postural abnormalities, and instruct in home and community integration to attain remaining goals. Progress toward goals / Updated goals:       Patient is making slow progress towards their goals at this time. Knee extension was full post manual today, but still painful getting into terminal knee ext, and still limited ambulatory ability with buckling in knee via hyperextension. . All goals remain applicable at this time.      PLAN  [x]  Upgrade activities as tolerated yes Continue plan of care   []  Discharge due to :    []  Other:      Therapist: Chanelle Wellington PT    Date: 8/15/2022 Time: 10:23 AM     Future Appointments   Date Time Provider Nabeel Gordon   8/15/2022  2:15 PM Prince Smith, PT [facility]   8/18/2022  8:30 AM James Rodney, [address] [facility]   8/22/2022  7:00 AM James Rodney, [address]  [facility]   8/24/2022 10:30 AM Prince Smith, [address] [facility]

## 2022-08-18 ENCOUNTER — HOSPITAL ENCOUNTER (OUTPATIENT)
Dept: PHYSICAL THERAPY | Age: 22
Discharge: HOME | End: 2022-08-18
Payer: COMMERCIAL

## 2022-08-18 PROCEDURE — 97110 THERAPEUTIC EXERCISES: CPT

## 2022-08-18 PROCEDURE — 97112 NEUROMUSCULAR REEDUCATION: CPT

## 2022-08-18 PROCEDURE — 97140 MANUAL THERAPY 1/> REGIONS: CPT

## 2022-08-18 NOTE — PROGRESS NOTES
[address] [facility] PHYSICAL THERAPY  [address] [place] [address], [facility],[place], [facility] - Phone: [phone]  Fax: [phone]  PROGRESS NOTE  Patient Name: Paola Adams : 2000   Treatment/Medical Diagnosis: Right knee pain [M25.561]   Referral Source: Ricardo Patel,*     Date of Initial Visit: 22 Attended Visits: 7 Missed Visits: 0     SUMMARY OF TREATMENT  Patient has attended 8 PT sessions, including an initial evaluation for R knee pain s/p ACL rupture on 7/15/22 . PT interventions have included manual therapy, therapeutic exercises, functional activities, neuromuscular reeducation, and HEP to improve R knee ROM, strength, stability and functional tasks. CP used to for pain control and reduce edema. Progress note done today due to pt's follow up with MD tomorrow (22). CURRENT STATUS  Pt has attended ~3 weeks of PT and has made some progress in overall function. Reduced pain ranges from worst 9/10 to 0-5/10, noting most pain occurring with feeling of instability, twisting and buckling. Pt demos decent gait mechanics however challenged with prolonged standing / walking tasks. Tolerates stairs negotiation with reciprocal pattern, use of 1 HA for stability Patient reports waiting for further  / recommendation for surgical intervention to determine next steps. Advised pt to continue with ice usage to reduce symptoms. Current Objective Findings:   Knee AROM: lacking 2 deg - 110 deg   Knee Strength MMT: R knee flexion 4+/5, R knee extension 5/5  SLR with no quad lag present  Flexibility: R hamstring and gastroc mildly impaired   Edema: minimal     Goal/Measure of Progress Goal Met? 1. Pt will be IND with HEP to facilitate self care management. Status at last Eval: Goal established  Current Status: Pt reports I and complaint with initial HEP  yes   2.   Pt will improve L knee ROM to 0-130 deg to order to improve ability to improve ability to negotiate stairs with ease    Status at last Eval: -10-0-105 deg Current Status: 0-110 deg Progressing   3. Pt will be able to perform stair negotiation with </= 3/10 (within tolerable limits) signifying improvement in overall functional capacity and activity tolerance. Status at last Eval: Unable to tolerate on R (step to pattern) Current Status: Reciprocal gait pattern, 1/10 pain reported yes     New Goals to be achieved in __3_  weeks:  1. Pt will improve FOTO scores to 68 in order to show detectable change in overall function. 2. Pt will improve R knee flexion MMT to 5/5 for improved functional tasks   3. Pt will tolerate walking 1 mile with </=2/10 pain in R knee for improved endurance     RECOMMENDATIONS  Pt scheduled for follow up with MD to discuss progress in PT thus far and potential benefits of surgical intervention. Recommending 1-2x/week for 3 weeks to continue to improve R knee ROM, strength, and functional tasks pending MD recommendations on potential surgical intervention. Thank you for this referral.  If you have any questions/comments please contact us directly at [phone]. Thank you for allowing us to assist in the care of your patient. Therapist Signature: Millie Melgar PT Date: 8/18/2022     Time: 8:18 AM   NOTE TO PHYSICIAN:  PLEASE COMPLETE THE ORDERS BELOW AND FAX TO   [facility] Physical Therapy: [phone]  If you are unable to process this request in 24 hours please contact our office: [phone]    ___ I have read the above report and request that my patient continue as recommended.   ___ I have read the above report and request that my patient continue therapy with the following changes/special instructions:_________________________________________________________   ___ I have read the above report and request that my patient be discharged from therapy.      Physician Signature:        Date:       Time:                                 Nolan Anaid Haji

## 2022-08-18 NOTE — PROGRESS NOTES
PHYSICAL THERAPY - DAILY TREATMENT NOTE    Patient Name: Carter Allen        Date: 2022  : 2000   yes Patient  Verified  Visit #:   7   of   8  Insurance: Payor: Seferino Yates / Plan: Bridget Nogueira / Product Type: HMO /      In time: 043 Out time: 600   Total Treatment Time: 48     Medicare/BCBS Time Tracking (below)   Total Timed Codes (min):  38 1:1 Treatment Time:  48     TREATMENT AREA =  Right knee pain [M25.561]    SUBJECTIVE  Pain Level (on 0 to 10 scale):  1 / 10   Medication Changes/New allergies or changes in medical history, any new surgeries or procedures?    no  If yes, update Summary List   Subjective Functional Status/Changes:  []  No changes reported     Pt states she did a lot of walking the past 2 days and her knee is sore.  Has MD appointment tomorrow to discuss           OBJECTIVE  Modalities Rationale:     decrease inflammation and decrease pain to improve patient's ability to perform ADL's and functional tasks    min [] Estim, type/location:                                      []  att     []  unatt     []  w/US     []  w/ice    []  w/heat    min []  Mechanical Traction: type/lbs                   []  pro   []  sup   []  int   []  cont    []  before manual    []  after manual    min []  Ultrasound, settings/location:      min []  Iontophoresis w/ dexamethasone, location:                                               []  take home patch       []  in clinic   10 min [x]  Ice     []  Heat    location/position: R knee in inclined position    min []  Vasopneumatic Device, press/temp:    If using vaso (only need to measure limb vaso being performed on)      pre-treatment girth :       post-treatment girth :       measured at (landmark location) :      min []  Other:    [x] Skin assessment post-treatment (if applicable):    [x]  intact    []  redness- no adverse reaction                  []redness - adverse reaction:      13 min Therapeutic Exercise:  [x]  See flow sheet Rationale:      increase ROM, increase strength, and improve coordination to improve the patients ability to perform ADL's and functional tasks      10 min Manual Therapy: STM to HS, calf/knee ext mobs gr 2-3    Rationale:      decrease pain, increase ROM, increase tissue extensibility, and decrease edema  to improve patient's ability to perform ADL's and functional tasks   The manual therapy interventions were performed at a separate and distinct time from the therapeutic activities interventions. 15 min Neuromuscular Re-ed: [x]  See flow sheet   Rationale:    increase strength, improve coordination, improve balance, and increase proprioception to improve the patients ability for proper muscle recruitment during functional tasks       Billed With/As:   [x] TE   [] TA   [] Neuro   [] Self Care Patient Education: [x] Review HEP    [] Progressed/Changed HEP based on:   [] positioning   [] body mechanics   [] transfers   [] heat/ice application    [] other:      Other Objective/Functional Measures:    See PN     Post Treatment Pain Level (on 0 to 10) scale:   1  / 10     ASSESSMENT  Assessment/Changes in Function:     See PN     []  See Progress Note/Recertification   Patient will continue to benefit from skilled PT services to modify and progress therapeutic interventions, address functional mobility deficits, address ROM deficits, address strength deficits, analyze and address soft tissue restrictions, analyze and cue movement patterns, and analyze and modify body mechanics/ergonomics to attain remaining goals depending on MD recommendation.     Progress toward goals / Updated goals:    See PN     PLAN  [x]  Upgrade activities as tolerated yes Continue plan of care   []  Discharge due to :    [x]  Other: Modify and adjust POC per MD suggestion based on potential surgical intervention      Therapist: Karishma Purdy PT    Date: 8/18/2022 Time: 8:18 AM     Future Appointments   Date Time Provider Department [place]   8/18/2022  8:30 AM Alvina Botello, PT [facility]   8/22/2022  7:00 AM Alvina Botello, [facility]   8/24/2022 10:30 AM Maykel Gamez, PT [facility]

## 2022-08-22 ENCOUNTER — HOSPITAL ENCOUNTER (OUTPATIENT)
Dept: PHYSICAL THERAPY | Age: 22
Discharge: HOME | End: 2022-08-22
Payer: COMMERCIAL

## 2022-08-22 PROCEDURE — 97140 MANUAL THERAPY 1/> REGIONS: CPT

## 2022-08-22 PROCEDURE — 97112 NEUROMUSCULAR REEDUCATION: CPT

## 2022-08-22 PROCEDURE — 97110 THERAPEUTIC EXERCISES: CPT

## 2022-08-22 NOTE — PROGRESS NOTES
PHYSICAL THERAPY - DAILY TREATMENT NOTE    Patient Name: Patrica Andrews        Date: 2022  : 2000   yes Patient  Verified  Visit #:   8   of   8  Insurance: Payor: Fayetta Hamman / Plan: Kristine Burrell / Product Type: HMO /      In time: 7:00 a Out time: 7:45 a   Total Treatment Time: 45     Medicare/[facility] Time Tracking (below)   Total Timed Codes (min):  45 1:1 Treatment Time:  45     TREATMENT AREA =  Right knee pain [M25.561]    SUBJECTIVE  Pain Level (on 0 to 10 scale):  0  / 10   Medication Changes/New allergies or changes in medical history, any new surgeries or procedures?    no  If yes, update Summary List   Subjective Functional Status/Changes:  []  No changes reported     PT states MD was sick so she did not have follow up appointment last week, rescheduled for Thursday (). Knee shifted this week while she was moving in bed, felt like it was dislocating / sharp pain. OBJECTIVE    15 min Therapeutic Exercise:  [x]  See flow sheet   Rationale:      increase ROM and increase strength to improve the patients ability to perform ADL's and standing tasks. 10 min Manual Therapy: STM to HS, calf complex  Ext mobs grade I-III   Rationale:      decrease pain, increase ROM, and increase tissue extensibility to improve patient's ability to tolerate ADL's and standing tasks   The manual therapy interventions were performed at a separate and distinct time from the therapeutic activities interventions.     20 min Neuromuscular Re-ed: [x]  See flow sheet   Rationale:    increase strength, improve coordination, and increase proprioception to improve the patients ability for muscle recruitment during functional tasks       Billed With/As:   [x] TE   [] TA   [] Neuro   [] Self Care Patient Education: [x] Review HEP    [] Progressed/Changed HEP based on:   [] positioning   [] body mechanics   [] transfers   [] heat/ice application    [] other:      Other Objective/Functional Measures:    Attempted cable column pull through's today to initiate hinge pattern for posterior chain strengthening, pt required verbal and tactile cues for correct hinging form. Achieves 0 deg R knee extension with feeling of tightness / pressure     Post Treatment Pain Level (on 0 to 10) scale:   0  / 10     ASSESSMENT  Assessment/Changes in Function:     Pt faired well during session today. Progressed CKC exercises and included additional posterior chain strengthening exercises which were tolerable. Pt denied need for cold pack today. Pt plans to cancel PT appointment on Wednesday and will follow up with clinic after MD appointment. []  See Progress Note/Recertification   Patient will continue to benefit from skilled PT services to modify and progress therapeutic interventions, address functional mobility deficits, address ROM deficits, address strength deficits, analyze and address soft tissue restrictions, analyze and cue movement patterns, and analyze and modify body mechanics/ergonomics to attain remaining goals. Progress toward goals / Updated goals:    Pt making slow progress towards all goals at this time. All goals remain applicable at this time.       PLAN  [x]  Upgrade activities as tolerated yes Continue plan of care   []  Discharge due to :    []  Other:      Therapist: Addison Arauz PT    Date: 8/22/2022 Time: 6:58 AM     Future Appointments   Date Time Provider Nabeel Gordon   8/22/2022  7:00 AM Rickey Pardo, PT [facility]   8/24/2022 10:30 AM Rocky Hernandez, Nevaeh Burgess [facility]

## 2022-08-24 ENCOUNTER — APPOINTMENT (OUTPATIENT)
Dept: PHYSICAL THERAPY | Age: 22
End: 2022-08-24
Payer: COMMERCIAL

## 2022-09-29 NOTE — THERAPY DISCHARGE
[address], [address] [place] [address], [facility] - Phone: [phone]  Fax: [phone] SUMMARY  Patient Name: Abel Sanders : 2000   Treatment/Medical Diagnosis: Right knee pain [M25.561]   Referral Source: Pastoracarol ann Sharla,*     Date of Initial Visit: 22 Attended Visits: 8 Missed Visits: 0     SUMMARY OF TREATMENT  Pt was seen for 8 visits including initial evaluation for R knee pain s/p R knee ACL rupture on 7/15/22. PT interventions have included manual therapy, therapeutic exercises, functional activities, neuromuscular reeducation, and HEP to improve R knee ROM, strength, stability and functional tasks. CP used to for pain control and reduce edema. CURRENT STATUS  See below for most recent progress note on 22. Pt attended 1 visit following progress note (22) and did not return; therefore will be d/c from PT at this time. 22 Findings:  Reduced pain ranges from worst 9/10 to 0-5/10, noting most pain occurring with feeling of instability, twisting and buckling. Pt demos decent gait mechanics however challenged with prolonged standing / walking tasks. Tolerates stairs negotiation with reciprocal pattern, use of 1 HA for stability Patient reports waiting for further  / recommendation for surgical intervention to determine next steps. Advised pt to continue with ice usage to reduce symptoms. Objective Findings:   Knee AROM: lacking 2 deg - 110 deg   Knee Strength MMT: R knee flexion 4+/5, R knee extension 5/5  SLR with no quad lag present  Flexibility: R hamstring and gastroc mildly impaired   Edema: minimal              Goal/Measure of Progress Goal Met? 1. Pt will be IND with HEP to facilitate self care management. Status at last Eval: Goal established  Current Status: Pt reports I and complaint with initial HEP  yes   2.   Pt will improve L knee ROM to 0-130 deg to order to improve ability to improve ability to negotiate stairs with ease    Status at last Eval: -10-0-105 deg Current Status: 0-110 deg Progressing   3. Pt will be able to perform stair negotiation with </= 3/10 (within tolerable limits) signifying improvement in overall functional capacity and activity tolerance. Status at last Eval: Unable to tolerate on R (step to pattern) Current Status: Reciprocal gait pattern, 1/10 pain reported yes       RECOMMENDATIONS  Other: Discontinue therapy at this time due to pt not returning    If you have any questions/comments please contact us directly at [phone]. Thank you for allowing us to assist in the care of your patient.     Therapist Signature: Jhoan Birmingham PT Date: 9/29/22     Time: 12:51 PM

## 2022-10-05 ENCOUNTER — HOSPITAL ENCOUNTER (OUTPATIENT)
Dept: PHYSICAL THERAPY | Age: 22
Discharge: HOME | End: 2022-10-05
Payer: COMMERCIAL

## 2022-10-05 PROCEDURE — 97161 PT EVAL LOW COMPLEX 20 MIN: CPT

## 2022-10-05 PROCEDURE — 97535 SELF CARE MNGMENT TRAINING: CPT

## 2022-10-05 NOTE — PROGRESS NOTES
PHYSICAL THERAPY - DAILY TREATMENT NOTE      Patient Name: Kirstin Gonsales        Date: 10/5/2022  : 2000   YES Patient  Verified  Visit #:     Insurance: Payor: Homa Blanc / Plan: Janee Pallas / Product Type: HMO /      In time: 11:30 Out time: 12:10   Total Treatment Time: 40     Medicare Time/[employer] Tracking (below)   Total Timed Codes (min):  n/a 1:1 Treatment Time:  n/a     TREATMENT AREA = Right knee pain [M25.561]     SUBJECTIVE    Pain Level (on 0 to 10 scale):  1  / 10   Medication Changes/New allergies or changes in medical history, any new surgeries or procedures? YES    If yes, update Summary List   Subjective Functional Status/Changes:  []  No changes reported       See Plan of Care       OBJECTIVE    15 min Self Care: Edu HEP, gait,    Rationale:    increase ROM and increase strength to improve the patients ability to walk    Billed With/As:   [] TE   [] TA   [] Neuro   [x] Self Care Patient Education: [x] Review HEP    [] Progressed/Changed HEP based on:   [] positioning   [] body mechanics   [] transfers   [] heat/ice application    [] other:      Other Objective/Functional Measures:    See Plan of Care     Post Treatment Pain Level (on 0 to 10) scale:   1  / 10     ASSESSMENT    Assessment/Changes in Function:     See Plan of Care    MD contacted and left message regarding confirmations of autograft, WB status and protocol. []  See Progress Note/Recertification   Patient will continue to benefit from skilled PT services to modify and progress therapeutic interventions, address functional mobility deficits, address ROM deficits, address strength deficits, analyze and address soft tissue restrictions, analyze and cue movement patterns, analyze and modify body mechanics/ergonomics, and assess and modify postural abnormalities to attain remaining goals. to attain remaining goals.    Progress toward goals / Updated goals:    See Plan of Care     PLAN    [x]  Upgrade activities as tolerated YES Continue plan of care   []  Discharge due to :    []  Other:      Therapist: Shannon Jesus PT    Date: 10/5/2022 Time: 1:02 PM     Future Appointments   Date Time Provider Nabeel Gordon   10/11/2022  9:30 AM Manuelita [place] [facility]   10/13/2022 10:45 AM Manuelita Mandie [address]   10/18/2022 10:00 AM [address]   10/20/2022 10:00 AM [address]   10/25/2022  9:30 AM Manuelita [place] [facility]   10/27/2022 10:00 AM [facility] [place] [facility]   11/1/2022 10:00 AM Manuelita [place] [facility]   11/3/2022 10:00 AM [address]

## 2022-10-05 NOTE — THERAPY EVALUATION
[address] [place] [address] PHYSICAL THERAPY  [address] [place] [address] [address],[facility], [facility] - Phone: [phone]  Fax: [phone] / 7370 [facility]  Patient Name: Jose Cruz Bella : 2000   Medical   Diagnosis: Right knee pain [M25.561] Treatment Diagnosis: Right ACL Reconstruction   Onset Date: 22     Referral Source: [facility] Start of Care [facility]): 10/5/2022   Prior Hospitalization: See medical history Provider #: [id number]   Prior Level of Function: Hx right ACL tear 2022   Comorbidities: Depression, anxiety, asthma   Medications: Verified on Patient Summary List   The Plan of Care and following information is based on the information from the initial evaluation.   ===========================================================================================  Subjective Findings:  History: Jose Cruz Bella is a 25 y.o.  yo female with Dx of Right knee pain [M25.561]. Patient reports onset of symptoms 2022 during a fall. Right ACL reconstruction on 22 hamstring autograft. Patient reports partial WB 6 weeks post-op. Using crutches and locked knee brace at zero degrees extension. Home entry 3 BELLE with one handrail, bed on second floor using last 3 nights. Using tub bench. Lives with parents. Pain: Patient currently rates pain as 10/10 at worst, 1/10 at best, primarily located at right knee. Functional Limitations: Patient complains of difficulty and increase pain with walking, stair negotiation. Work/Social/Recreational Activities: Previously working in home . 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 Objective Findings:   Right Knee AROM:0-70 degrees. Manual Muscle Testing: fair quad set, right VMO lag. Palpation: Incision appears to be healing well with steri-strips intact and no signs of redness, heat or drainage.  Per patient, cleared to shower after 72 hours post-op. Gait: Patient ambulating with crutches and knee extension brace locked at zero degrees PWB. Negotiated 4 steps safely with crutches using step to gait pattern. FOTO Score: 33 points. Clinical Impression: Right ACL reconstruction hamstring allograft (per patient) on 9/21/22 with PWB, crutches, 24 hour donning locked knee extension brace. Will contact MD to confirm procedure graft, WB status and protocol.  Patient will benefit from continued skilled services to further improve functional limitations.  ===========================================================================================  Eval Complexity: History LOW Complexity : Zero comorbidities / personal factors that will impact the outcome / POC;  Examination  HIGH Complexity : 4+ Standardized tests and measures addressing body structure, function, activity limitation and / or participation in recreation ; Presentation LOW Complexity : Stable, uncomplicated ;  Decision Making MEDIUM Complexity : FOTO score of 26-74; Overall Complexity LOW   Problem List: pain affecting function, decrease ROM, decrease strength, edema affecting function, impaired gait/ balance, decrease ADL/ functional abilitiies, decrease activity tolerance, decrease flexibility/ joint mobility, and decrease transfer abilities   Treatment Plan may include any combination of the following: Therapeutic exercise, Therapeutic activities, Neuromuscular re-education, Physical agent/modality, Gait/balance training, Manual therapy, Patient education, Self Care training, Functional mobility training, Home safety training, and Stair training  Patient / Family readiness to learn indicated by: asking questions, trying to perform skills, and interest  Persons(s) to be included in education: patient (P)  Barriers to Learning/Limitations: None  Measures taken, if barriers to learning:    Patient Goal (s): \"Full recovery\"   Patient self reported health status: good  Rehabilitation Potential: good  Short Term Goals: To be accomplished in  3  weeks:  Patient will report pain 3/10 at worst to improve tolerance to ADLs. Patient will 110 degrees of right knee flexion AROM to improve gait. Patient will increase FOTO Score to 46 points to improve tolerance to ADLs. Long Term Goals: To be accomplished in  6  weeks:  Patient will report pain 1/10 at worst to improve tolerance to ADLs. Patient will 120 degrees of right knee flexion AROM to improve tolerance to ADLs. Patient will increase FOTO Score to 61 points to improve tolerance to ADLs. Frequency / Duration:   Patient to be seen  2  times per week for 6  weeks:  Patient / Caregiver education and instruction: self care, activity modification, brace/ splint application, exercises, and other fall prevention, gait training, stair negotiation education    Therapist Signature: Ayana Wilkes PT Date: 77/8/7181   Certification Period: N/A Time: 11:31 AM   ===========================================================================================  I certify that the above Physical Therapy Services are being furnished while the patient is under my care. I agree with the treatment plan and certify that this therapy is necessary. Physician Signature:                            Date:       Time:        Ferris Slot,*       Please sign and return to [facility]. or you may fax the signed copy to [phone]. .  Thank you.

## 2022-10-10 NOTE — PROGRESS NOTES
PHYSICAL THERAPY - DAILY TREATMENT NOTE    Patient Name: Wilbur Gil        Date: 10/10/2022  : 2000   yes Patient  Verified  Visit #:      of   12  Insurance: Payor: Rosa Dias / Plan: Kayode Benitez / Product Type: HMO /      In time: 9:27 Out time: 10:10   Total Treatment Time: 43     Medicare/[employer] Time Tracking (below)   Total Timed Codes (min):  43 1:1 Treatment Time:  43     TREATMENT AREA =  Right knee pain [M25.561]    SUBJECTIVE  Pain Level (on 0 to 10 scale):  0  / 10   Medication Changes/New allergies or changes in medical history, any new surgeries or procedures?    no  If yes, update Summary List   Subjective Functional Status/Changes:  []  No changes reported     Stopped taking tylenol          OBJECTIVE  Modalities Rationale:     decrease pain to improve patient's ability to tolerate prolonged ambulation   min [] Estim, type/location:                                      []  att     []  unatt     []  w/US     []  w/ice    []  w/heat    min []  Mechanical Traction: type/lbs                   []  pro   []  sup   []  int   []  cont    []  before manual    []  after manual    min []  Ultrasound, settings/location:      min []  Iontophoresis w/ dexamethasone, location:                                               []  take home patch       []  in clinic   PD min []  Ice     []  Heat    location/position:     min []  Vasopneumatic Device, press/temp:     min []  Other:    [x] Skin assessment post-treatment (if applicable):    [x]  intact    []  redness- no adverse reaction     []redness - adverse reaction:        34 min Therapeutic Exercise:  [x]  See flow sheet   Rationale:      increase ROM and increase strength to improve the patients ability to tolerate prolonged ambulation     9 min Manual Therapy: Pat mobs, STM distal hamstring and proximal calf, PROM   Rationale:      decrease pain, increase ROM, increase tissue extensibility, decrease edema  and decrease trigger points to improve patient's ability to perform ADLs  The manual therapy interventions were performed at a separate and distinct time from the therapeutic activities interventions. Billed With/As:   [] TE   [] TA   [] Neuro   [] Self Care Patient Education: [x] Review HEP    [x] Progressed/Changed HEP based on:   [] positioning   [] body mechanics   [] transfers   [] heat/ice application    [] other:      Other Objective/Functional Measures: Added SLRs   R Knee AROM = -4 to 93 deg  VCing for proper form with quad and strengthening during SLRs in order to decrease compensatory movement for max benefit. Incr mm tone to R hamstring distally      Post Treatment Pain Level (on 0 to 10) scale:   0  / 10     ASSESSMENT  Assessment/Changes in Function:     Progressed R LE strengthening and flexibility program with good patient tolerance. Verbalized HEP in order to add SLRs. []  See Progress Note/Recertification   Patient will continue to benefit from skilled PT services to modify and progress therapeutic interventions, address functional mobility deficits, address ROM deficits, address strength deficits, analyze and address soft tissue restrictions, analyze and cue movement patterns, analyze and modify body mechanics/ergonomics, assess and modify postural abnormalities and instruct in home and community integration to attain remaining goals. Progress toward goals / Updated goals:    First visit after initial evaluation. Progress tx per POC.         PLAN  [x]  Upgrade activities as tolerated yes Continue plan of care   []  Discharge due to :    []  Other:      Therapist: Shanna Azar    Date: 10/10/2022 Time: 12:24 PM     Future Appointments   Date Time Provider Nabeel Gordon   10/11/2022  9:30 AM [address] [facility]   10/13/2022 10:45 AM Velasquez [address]   10/18/2022 10:00 AM Heather [address] [place] [address]   10/20/2022 10:00 AM Ariana Romero [address]   10/25/2022  9:30 AM Heather [place] [facility]   10/27/2022 10:00 AM [facility]   11/1/2022 10:00 AM [facility]   11/3/2022 10:00 AM Marcy Romero [address]

## 2022-10-11 ENCOUNTER — HOSPITAL ENCOUNTER (OUTPATIENT)
Dept: PHYSICAL THERAPY | Age: 22
Discharge: HOME | End: 2022-10-11
Payer: COMMERCIAL

## 2022-10-11 PROCEDURE — 97110 THERAPEUTIC EXERCISES: CPT

## 2022-10-11 PROCEDURE — 97140 MANUAL THERAPY 1/> REGIONS: CPT

## 2022-10-12 NOTE — PROGRESS NOTES
PHYSICAL THERAPY - DAILY TREATMENT NOTE    Patient Name: Bertha Fraga        Date: 10/13/2022  : 2000   yes Patient  Verified  Visit #:   3   of   12  Insurance: Payor: Rashawn Dominguez / Plan: Debbie Ricketts / Product Type: HMO /    In time: 10:40 Out time: 11:26   Total Treatment Time: 46     Medicare/[employer] Time Tracking (below)   Total Timed Codes (min):  46 1:1 Treatment Time: 42     TREATMENT AREA =  Right knee pain [M25.561]    SUBJECTIVE  Pain Level (on 0 to 10 scale):  0  / 10   Medication Changes/New allergies or changes in medical history, any new surgeries or procedures?    no  If yes, update Summary List   Subjective Functional Status/Changes:  []  No changes reported     No pain                 OBJECTIVE  Modalities Rationale:     decrease pain to improve patient's ability to tolerate prolonged ambulation   min [] Estim, type/location:                                      []  att     []  unatt     []  w/US     []  w/ice    []  w/heat    min []  Mechanical Traction: type/lbs                   []  pro   []  sup   []  int   []  cont    []  before manual    []  after manual    min []  Ultrasound, settings/location:      min []  Iontophoresis w/ dexamethasone, location:                                               []  take home patch       []  in clinic   PD min []  Ice     []  Heat    location/position:     min []  Vasopneumatic Device, press/temp:     min []  Other:    [x] Skin assessment post-treatment (if applicable):    [x]  intact    []  redness- no adverse reaction     []redness - adverse reaction:        37 (Bill 33) min Therapeutic Exercise:  [x]  See flow sheet   Rationale:      increase ROM and increase strength to improve the patients ability to tolerate prolonged ambulation     9 min Manual Therapy: Pat mobs, STM distal hamstring and proximal calf, PROM   Rationale:      decrease pain, increase ROM, increase tissue extensibility, decrease edema  and decrease trigger points to improve patient's ability to perform ADLs  The manual therapy interventions were performed at a separate and distinct time from the therapeutic activities interventions. Billed With/As:   [] TE   [] TA   [] Neuro   [] Self Care Patient Education: [x] Review HEP    [x] Progressed/Changed HEP based on:   [] positioning   [] body mechanics   [] transfers   [] heat/ice application    [] other:      Other Objective/Functional Measures: Added weight shifting (PWB) and rocking on bike   R Knee AROM = 0 to 97 deg     Post Treatment Pain Level (on 0 to 10) scale:   0  / 10     ASSESSMENT  Assessment/Changes in Function:     Continues to demonstrate limitations in R knee flexion ROM, however, does continue to demonstrate improvements in motion each treatment session. Progressed therex as appropriate with good tolerance - denies pain during new exercises and post session. []  See Progress Note/Recertification   Patient will continue to benefit from skilled PT services to modify and progress therapeutic interventions, address functional mobility deficits, address ROM deficits, address strength deficits, analyze and address soft tissue restrictions, analyze and cue movement patterns, analyze and modify body mechanics/ergonomics, assess and modify postural abnormalities and instruct in home and community integration to attain remaining goals. Progress toward goals / Updated goals:    First visit after initial evaluation. Progress tx per POC.         PLAN  [x]  Upgrade activities as tolerated yes Continue plan of care   []  Discharge due to :    []  Other:      Therapist: Yara Santizo    Date: 10/13/2022 Time: 12:24 PM     Future Appointments   Date Time Provider Nabeel Gordon   10/13/2022 10:45 AM Manuelita [place] [facility]   10/18/2022 10:00 AM [facility] [place] [facility]   10/20/2022 10:00 AM Manuelita [place] [address]   10/25/2022  9:30 AM Manuelita [place] [address]   10/27/2022 10:00 AM [address] [facility]   11/1/2022 10:00 AM [address] [facility]   11/3/2022 10:00 AM Marcela Romero [facility]

## 2022-10-13 ENCOUNTER — HOSPITAL ENCOUNTER (OUTPATIENT)
Dept: PHYSICAL THERAPY | Age: 22
Discharge: HOME | End: 2022-10-13
Payer: COMMERCIAL

## 2022-10-13 PROCEDURE — 97140 MANUAL THERAPY 1/> REGIONS: CPT

## 2022-10-13 PROCEDURE — 97110 THERAPEUTIC EXERCISES: CPT

## 2022-10-18 ENCOUNTER — HOSPITAL ENCOUNTER (OUTPATIENT)
Dept: PHYSICAL THERAPY | Age: 22
Discharge: HOME | End: 2022-10-18
Payer: COMMERCIAL

## 2022-10-18 PROCEDURE — 97110 THERAPEUTIC EXERCISES: CPT

## 2022-10-18 PROCEDURE — 97140 MANUAL THERAPY 1/> REGIONS: CPT

## 2022-10-18 NOTE — PROGRESS NOTES
PHYSICAL THERAPY - DAILY TREATMENT NOTE    Patient Name: Elisabeth López        Date: 10/18/2022  : 2000   yes Patient  Verified  Visit #:      12  Insurance: Payor: Kira Hitchcock / Plan: Ene Sousa / Product Type: HMO /    In time: 10:00 Out time: 10:52   Total Treatment Time: 52     Medicare/[employer]BS Time Tracking (below)   Total Timed Codes (min):  52 1:1 Treatment Time: 39     TREATMENT AREA =  Right knee pain [M25.561]    SUBJECTIVE  Pain Level (on 0 to 10 scale):  0  / 10   Medication Changes/New allergies or changes in medical history, any new surgeries or procedures?    no  If yes, update Summary List   Subjective Functional Status/Changes:  []  No changes reported     Only hand pain - possibly from latching the brace - weakness in the hand. Leg has been fine - I think I strained my hamstring taking the brace off felt a pull but its better now. Did message the doctor = tole me to ice and pobably just a strain and didn't sound like anything severe.            OBJECTIVE  Modalities Rationale:     decrease pain to improve patient's ability to tolerate prolonged ambulation   min [] Estim, type/location:                                      []  att     []  unatt     []  w/US     []  w/ice    []  w/heat    min []  Mechanical Traction: type/lbs                   []  pro   []  sup   []  int   []  cont    []  before manual    []  after manual    min []  Ultrasound, settings/location:      min []  Iontophoresis w/ dexamethasone, location:                                               []  take home patch       []  in clinic   PD min []  Ice     []  Heat    location/position:     min []  Vasopneumatic Device, press/temp:     min []  Other:    [x] Skin assessment post-treatment (if applicable):    [x]  intact    []  redness- no adverse reaction     []redness - adverse reaction:        42 (Bill 29) min Therapeutic Exercise:  [x]  See flow sheet   Rationale:      increase ROM and increase strength to improve the patients ability to tolerate prolonged ambulation     10 min Manual Therapy: STM distal hamstring and proximal calf in prone, supine R knee flexion PROM   Rationale:      decrease pain, increase ROM, increase tissue extensibility, decrease edema  and decrease trigger points to improve patient's ability to perform ADLs  The manual therapy interventions were performed at a separate and distinct time from the therapeutic activities interventions. Billed With/As:   [] TE   [] TA   [] Neuro   [] Self Care Patient Education: [x] Review HEP    [x] Progressed/Changed HEP based on:   [] positioning   [] body mechanics   [] transfers   [] heat/ice application    [] other:      Other Objective/Functional Measures: Added sitting HR/TR   R Knee AROM = +2 to 101 deg  Demonstrates mild bruising medially near distal medial hamstring      Post Treatment Pain Level (on 0 to 10) scale:   0  / 10     ASSESSMENT  Assessment/Changes in Function:     Continuing with PWB status for first 4 weeks per MD post op note. Progressed sitting LE strengthening with good tolerance. []  See Progress Note/Recertification   Patient will continue to benefit from skilled PT services to modify and progress therapeutic interventions, address functional mobility deficits, address ROM deficits, address strength deficits, analyze and address soft tissue restrictions, analyze and cue movement patterns, analyze and modify body mechanics/ergonomics, assess and modify postural abnormalities and instruct in home and community integration to attain remaining goals. Progress toward goals / Updated goals:    Patient will report pain 3/10 at worst to improve tolerance to ADLs. R knee pain 3/10 at the worst (10/18/22)  Patient will 110 degrees of right knee flexion AROM to improve gait.  R knee flexion ROM = 101 deg (10/18/22)       PLAN  [x]  Upgrade activities as tolerated yes Continue plan of care   []  Discharge due to :    []  Other: Therapist: Gavin Romero    Date: 10/18/2022 Time: 12:24 PM     Future Appointments   Date Time Provider Nabeel Gordon   10/18/2022 10:00 AM Rhona Vera [facility]   10/20/2022 10:00 AM Rhona Vera [facility]   10/25/2022  9:30 AM Rhona Vera [facility]   10/27/2022 10:00 AM Rhona Vera [facility]   11/1/2022 10:00 AM Rhona Vera [facility]   11/3/2022 10:00 AM Gavin Romero [facility]

## 2022-10-20 ENCOUNTER — HOSPITAL ENCOUNTER (OUTPATIENT)
Dept: PHYSICAL THERAPY | Age: 22
Discharge: HOME | End: 2022-10-20
Payer: COMMERCIAL

## 2022-10-20 PROCEDURE — 97110 THERAPEUTIC EXERCISES: CPT

## 2022-10-20 PROCEDURE — 97140 MANUAL THERAPY 1/> REGIONS: CPT

## 2022-10-20 NOTE — PROGRESS NOTES
PHYSICAL THERAPY - DAILY TREATMENT NOTE    Patient Name: Nir Cedillo        Date: 10/20/2022  : 2000   yes Patient  Verified  Visit #:      12  Insurance: Payor: Elidia Gagnon / Plan: Andrea Clancy / Product Type: HMO /    In time: 9:59 Out time: 10:45   Total Treatment Time: 46     Medicare/[facility] Time Tracking (below)   Total Timed Codes (min):  46 1:1 Treatment Time: 39     TREATMENT AREA =  Right knee pain [M25.561]    SUBJECTIVE  Pain Level (on 0 to 10 scale):  0  / 10   Medication Changes/New allergies or changes in medical history, any new surgeries or procedures?    no  If yes, update Summary List   Subjective Functional Status/Changes:  []  No changes reported     Ill be so excited excited when I can get off the crutches           OBJECTIVE  Modalities Rationale:     decrease pain to improve patient's ability to tolerate prolonged ambulation   min [] Estim, type/location:                                      []  att     []  unatt     []  w/US     []  w/ice    []  w/heat    min []  Mechanical Traction: type/lbs                   []  pro   []  sup   []  int   []  cont    []  before manual    []  after manual    min []  Ultrasound, settings/location:      min []  Iontophoresis w/ dexamethasone, location:                                               []  take home patch       []  in clinic   PD min []  Ice     []  Heat    location/position:     min []  Vasopneumatic Device, press/temp:     min []  Other:    [x] Skin assessment post-treatment (if applicable):    [x]  intact    []  redness- no adverse reaction     []redness - adverse reaction:        38(Bill 31) min Therapeutic Exercise:  [x]  See flow sheet   Rationale:      increase ROM and increase strength to improve the patients ability to tolerate prolonged ambulation     8 min Manual Therapy: STM distal hamstring and proximal calf in prone, supine R knee flexion PROM   Rationale:      decrease pain, increase ROM, increase tissue extensibility, decrease edema  and decrease trigger points to improve patient's ability to perform ADLs  The manual therapy interventions were performed at a separate and distinct time from the therapeutic activities interventions. Billed With/As:   [] TE   [] TA   [] Neuro   [] Self Care Patient Education: [x] Review HEP    [x] Progressed/Changed HEP based on:   [] positioning   [] body mechanics   [] transfers   [] heat/ice application    [] other:      Other Objective/Functional Measures:    R Knee AROM = 105 deg  Added bridge   Able to move recumbent bike seat forward during rocking - indicating improvements in R knee ROM in flexion      Post Treatment Pain Level (on 0 to 10) scale:  0 / 10     ASSESSMENT  Assessment/Changes in Function:     Continued with therex to include AROM, LE flexibility, and quad strengthening with good tolerance. End range tightness and continued limitations in R knee flexion ROM. []  See Progress Note/Recertification   Patient will continue to benefit from skilled PT services to modify and progress therapeutic interventions, address functional mobility deficits, address ROM deficits, address strength deficits, analyze and address soft tissue restrictions, analyze and cue movement patterns, analyze and modify body mechanics/ergonomics, assess and modify postural abnormalities and instruct in home and community integration to attain remaining goals. Progress toward goals / Updated goals:    Patient will report pain 3/10 at worst to improve tolerance to ADLs. R knee pain 3/10 at the worst (10/18/22)  Patient will 110 degrees of right knee flexion AROM to improve gait.  R knee flexion ROM = 105 deg (10/2022)       PLAN  [x]  Upgrade activities as tolerated yes Continue plan of care   []  Discharge due to :    []  Other:      Therapist: Félix Estrada    Date: 10/20/2022 Time: 12:24 PM     Future Appointments   Date Time Provider Nabeel Gordon   10/20/2022 10:00 AM Naoma Jacey [facility]   10/25/2022  9:30 AM Naoma Jacey [facility]   10/27/2022 10:00 AM Naoma Jacey [facility]   11/1/2022 10:00 AM Naoma Jacey [facility]   11/3/2022 10:00 AM Yuli Romero [facility]

## 2022-10-24 NOTE — PROGRESS NOTES
PHYSICAL THERAPY - DAILY TREATMENT NOTE    Patient Name: Abiel Nieto        Date: 10/25/2022  : 2000   yes Patient  Verified  Visit #:      12  Insurance: Payor: Gab Cornejo / Plan: Mariam Velasco / Product Type: HMO /    In time: 9:31 Out time: 10:20   Total Treatment Time: 49     Medicare/[employer]BS Time Tracking (below)   Total Timed Codes (min):  49 1:1 Treatment Time: 38     TREATMENT AREA =  Right knee pain [M25.561]    SUBJECTIVE  Pain Level (on 0 to 10 scale):  0  / 10   Medication Changes/New allergies or changes in medical history, any new surgeries or procedures?    no  If yes, update Summary List   Subjective Functional Status/Changes:  []  No changes reported     Reports not sleeping well last night.  Notes non painful cracking when moving knee from straight to bent         OBJECTIVE  Modalities Rationale:     decrease pain to improve patient's ability to tolerate prolonged ambulation   min [] Estim, type/location:                                      []  att     []  unatt     []  w/US     []  w/ice    []  w/heat    min []  Mechanical Traction: type/lbs                   []  pro   []  sup   []  int   []  cont    []  before manual    []  after manual    min []  Ultrasound, settings/location:      min []  Iontophoresis w/ dexamethasone, location:                                               []  take home patch       []  in clinic   PD min []  Ice     []  Heat    location/position:     min []  Vasopneumatic Device, press/temp:     min []  Other:    [x] Skin assessment post-treatment (if applicable):    [x]  intact    []  redness- no adverse reaction     []redness - adverse reaction:        39  (Bill 28) min Therapeutic Exercise:  [x]  See flow sheet   Rationale:      increase ROM and increase strength to improve the patients ability to tolerate prolonged ambulation     10 min Manual Therapy: STM distal hamstring and quadriceps, supine R knee flexion PROM   Rationale:      decrease pain, increase ROM, increase tissue extensibility, decrease edema  and decrease trigger points to improve patient's ability to perform ADLs  The manual therapy interventions were performed at a separate and distinct time from the therapeutic activities interventions. Billed With/As:   [] TE   [] TA   [] Neuro   [] Self Care Patient Education: [x] Review HEP    [x] Progressed/Changed HEP based on:   [] positioning   [] body mechanics   [] transfers   [] heat/ice application    [] other:      Other Objective/Functional Measures:    R Knee AROM = 108 deg  Decreased hip excursion w/ supine bridge ~65% bridge  Able to move seat closer during rocking - performs full revolutions     Post Treatment Pain Level (on 0 to 10) scale:  0 / 10     ASSESSMENT  Assessment/Changes in Function:     Continued with treatment program with good tolerance. Progressions are limited by 6 week limited WBing restrictions at this time. []  See Progress Note/Recertification   Patient will continue to benefit from skilled PT services to modify and progress therapeutic interventions, address functional mobility deficits, address ROM deficits, address strength deficits, analyze and address soft tissue restrictions, analyze and cue movement patterns, analyze and modify body mechanics/ergonomics, assess and modify postural abnormalities and instruct in home and community integration to attain remaining goals. Progress toward goals / Updated goals:    Patient will report pain 3/10 at worst to improve tolerance to ADLs. R knee pain 3/10 at the worst (10/18/22)  Patient will 110 degrees of right knee flexion AROM to improve gait.  R knee flexion ROM = 108 deg (10/25/22)       PLAN  [x]  Upgrade activities as tolerated yes Continue plan of care   []  Discharge due to :    []  Other:      Therapist: Natalia Martin    Date: 10/25/2022 Time: 12:24 PM     Future Appointments   Date Time Provider Nabeel Gordon   10/25/2022  9:30 AM Heather Chey[facility]   10/27/2022 10:00 AM Nicole Kennedy [facility]   11/1/2022 10:00 AM Nicole Kennedy [facility]   11/3/2022 10:00 AM Alec Romero [id number]

## 2022-10-25 ENCOUNTER — HOSPITAL ENCOUNTER (OUTPATIENT)
Dept: PHYSICAL THERAPY | Age: 22
Discharge: HOME | End: 2022-10-25
Payer: COMMERCIAL

## 2022-10-25 PROCEDURE — 97110 THERAPEUTIC EXERCISES: CPT

## 2022-10-25 PROCEDURE — 97140 MANUAL THERAPY 1/> REGIONS: CPT

## 2022-10-27 ENCOUNTER — HOSPITAL ENCOUNTER (OUTPATIENT)
Dept: PHYSICAL THERAPY | Age: 22
Discharge: HOME | End: 2022-10-27
Payer: COMMERCIAL

## 2022-10-27 PROCEDURE — 97140 MANUAL THERAPY 1/> REGIONS: CPT

## 2022-10-27 PROCEDURE — 97110 THERAPEUTIC EXERCISES: CPT

## 2022-10-27 NOTE — PROGRESS NOTES
PHYSICAL THERAPY - DAILY TREATMENT NOTE    Patient Name: Jose Cruz Bella        Date: 10/27/2022  : 2000   yes Patient  Verified  Visit #:     Insurance: Payor: Tammy Phillips / Plan: Ayaan Rico / Product Type: HMO /    In time: 10:01 Out time: 10:53   Total Treatment Time: 52     Medicare/BCBS Time Tracking (below)   Total Timed Codes (min):  52 1:1 Treatment Time: 38     TREATMENT AREA =  Right knee pain [M25.561]    SUBJECTIVE  Pain Level (on 0 to 10 scale):  0  / 10   Medication Changes/New allergies or changes in medical history, any new surgeries or procedures?    no  If yes, update Summary List   Subjective Functional Status/Changes:  []  No changes reported     Reports continued cracking on the knee, notes R knee buckeled yesterday           OBJECTIVE  Modalities Rationale:     decrease pain to improve patient's ability to tolerate prolonged ambulation   min [] Estim, type/location:                                      []  att     []  unatt     []  w/US     []  w/ice    []  w/heat    min []  Mechanical Traction: type/lbs                   []  pro   []  sup   []  int   []  cont    []  before manual    []  after manual    min []  Ultrasound, settings/location:      min []  Iontophoresis w/ dexamethasone, location:                                               []  take home patch       []  in clinic   PD min []  Ice     []  Heat    location/position:     min []  Vasopneumatic Device, press/temp:     min []  Other:    [x] Skin assessment post-treatment (if applicable):    [x]  intact    []  redness- no adverse reaction     []redness - adverse reaction:        43  (Bill 29) min Therapeutic Exercise:  [x]  See flow sheet   Rationale:      increase ROM and increase strength to improve the patients ability to tolerate prolonged ambulation     9 min Manual Therapy: STM distal hamstring and quadriceps, supine R knee flexion PROM   Rationale:      decrease pain, increase ROM, increase tissue extensibility, decrease edema  and decrease trigger points to improve patient's ability to perform ADLs  The manual therapy interventions were performed at a separate and distinct time from the therapeutic activities interventions. Billed With/As:   [] TE   [] TA   [] Neuro   [] Self Care Patient Education: [x] Review HEP    [x] Progressed/Changed HEP based on:   [] positioning   [] body mechanics   [] transfers   [] heat/ice application    [] other:      Other Objective/Functional Measures:    R Knee AROM = 109 deg  Added TKE ball      Post Treatment Pain Level (on 0 to 10) scale:  0 / 10     ASSESSMENT  Assessment/Changes in Function:     Pt edu on rationale of knee buckling pre surgery versus post surgery. Increased challenge with addition of TKE. Will progress WBing therex NV pending MD clearance at 10/31/22 appt. []  See Progress Note/Recertification   Patient will continue to benefit from skilled PT services to modify and progress therapeutic interventions, address functional mobility deficits, address ROM deficits, address strength deficits, analyze and address soft tissue restrictions, analyze and cue movement patterns, analyze and modify body mechanics/ergonomics, assess and modify postural abnormalities and instruct in home and community integration to attain remaining goals. Progress toward goals / Updated goals:    Patient will report pain 3/10 at worst to improve tolerance to ADLs. R knee pain 3/10 at the worst (10/18/22)  Patient will 110 degrees of right knee flexion AROM to improve gait.  R knee flexion ROM = 109 deg (10/27/22)       PLAN  [x]  Upgrade activities as tolerated yes Continue plan of care   []  Discharge due to :    []  Other:      Therapist: Shanna Azar    Date: 10/27/2022 Time: 12:24 PM     Future Appointments   Date Time Provider Nabeel Gordon   10/27/2022 10:00 AM Gómez Jhaveri   11/1/2022 10:00 AM Velasquez [facility]   11/3/2022 10:00 AM Katy Suarez

## 2022-10-31 NOTE — PROGRESS NOTES
PHYSICAL THERAPY - DAILY TREATMENT NOTE    Patient Name: Gregorio Mortensen        Date: 2022  : 2000   yes Patient  Verified  Visit #:     Insurance: Payor: Althea Espino / Plan: Millie Stockton / Product Type: HMO /    In time: 10:02 Out time: 11:10   Total Treatment Time: 68     Medicare/[facility] Time Tracking (below)   Total Timed Codes (min):  58 1:1 Treatment Time: 43     TREATMENT AREA =  Right knee pain [M25.561]    SUBJECTIVE  Pain Level (on 0 to 10 scale):  0  / 10   Medication Changes/New allergies or changes in medical history, any new surgeries or procedures?    no  If yes, update Summary List   Subjective Functional Status/Changes:  []  No changes reported     Remain in Brace locked in extension (until quad tone and function return) for 4-6 more weeks unless sitting, use crutches till tone in quad improves, ROM is good. He said he thinks its the knee cap readjusting - popping is pretty normal. Itchy stitches - he said its the internal stiches desolving. He said I can drive.  Once the quad strengthens I can stop using the crutches            OBJECTIVE  Modalities Rationale:     decrease pain to improve patient's ability to tolerate prolonged ambulation  10 min [] Estim, type/location: [place], 65 pps, 10/10 cycle; 5 sec ramp; R quad in long sit                                     []  att     [x]  unatt     []  w/US     [x]  w/ice    []  w/heat    min []  Mechanical Traction: type/lbs                   []  pro   []  sup   []  int   []  cont    []  before manual    []  after manual    min []  Ultrasound, settings/location:      min []  Iontophoresis w/ dexamethasone, location:                                               []  take home patch       []  in clinic   PD min []  Ice     []  Heat    location/position:     min []  Vasopneumatic Device, press/temp:     min []  Other:    [x] Skin assessment post-treatment (if applicable):    [x]  intact    []  redness- no adverse reaction []redness - adverse reaction:        50  (Bill 35) min Therapeutic Exercise:  [x]  See flow sheet   Rationale:      increase ROM and increase strength to improve the patients ability to tolerate prolonged ambulation     NT min Manual Therapy: STM distal hamstring and quadriceps, supine R knee flexion PROM   Rationale:      decrease pain, increase ROM, increase tissue extensibility, decrease edema  and decrease trigger points to improve patient's ability to perform ADLs  The manual therapy interventions were performed at a separate and distinct time from the therapeutic activities interventions. 8 min Gait Training:  __30_ feet without 1 crutch wearing brace unlocked on level surfaces with _SBA__ level of assistance; NR navigation of small hurdles in // bars with light UEs    Rationale: To improve ambulation safety and efficiency in order to improve patient's ability to safely ambulate in various environments       Billed With/As:   [] TE   [] TA   [] Neuro   [] Self Care Patient Education: [x] Review HEP    [x] Progressed/Changed HEP based on:   [] positioning   [] body mechanics   [] transfers   [] heat/ice application    [] other:      Other Objective/Functional Measures:    R Knee AROM = 112 deg  Added SLS with Taps in brace and incline calf stretch\  Added small buddy navigation I // bars - difficulty with navigating R LE with L LE leading secondary to kne flexion PROM liitations; encouraged R knee flexion with buddy navigation versus circumduction compensatory motion in order to clear obstacle     Post Treatment Pain Level (on 0 to 10) scale:  0 / 10     ASSESSMENT  Assessment/Changes in Function:     Able to progress therex with increased challenge emphasis on quad strengthening and gait mechanics.       []  See Progress Note/Recertification   Patient will continue to benefit from skilled PT services to modify and progress therapeutic interventions, address functional mobility deficits, address ROM deficits, address strength deficits, analyze and address soft tissue restrictions, analyze and cue movement patterns, analyze and modify body mechanics/ergonomics, assess and modify postural abnormalities and instruct in home and community integration to attain remaining goals. Progress toward goals / Updated goals:    Patient will report pain 3/10 at worst to improve tolerance to ADLs. R knee pain 3/10 at the worst (10/18/22)  Patient will 110 degrees of right knee flexion AROM to improve gait.  Goal Met R knee flexion ROM = 112 deg (10/27/22)       PLAN  [x]  Upgrade activities as tolerated yes Continue plan of care   []  Discharge due to :    []  Other:      Therapist: Brandon Green    Date: 11/1/2022 Time: 12:24 PM     Future Appointments   Date Time Provider Nabeel Gordon   11/1/2022 10:00 AM Meir Robertson   11/3/2022 10:00 AM Gaby Romero

## 2022-11-01 ENCOUNTER — HOSPITAL ENCOUNTER (OUTPATIENT)
Dept: PHYSICAL THERAPY | Age: 22
Discharge: HOME | End: 2022-11-01
Payer: COMMERCIAL

## 2022-11-01 PROCEDURE — 97014 ELECTRIC STIMULATION THERAPY: CPT

## 2022-11-01 PROCEDURE — 97110 THERAPEUTIC EXERCISES: CPT

## 2022-11-01 PROCEDURE — 97116 GAIT TRAINING THERAPY: CPT

## 2022-11-03 ENCOUNTER — HOSPITAL ENCOUNTER (OUTPATIENT)
Dept: PHYSICAL THERAPY | Age: 22
Discharge: HOME | End: 2022-11-03
Payer: COMMERCIAL

## 2022-11-03 PROCEDURE — 97116 GAIT TRAINING THERAPY: CPT

## 2022-11-03 PROCEDURE — 97110 THERAPEUTIC EXERCISES: CPT

## 2022-11-03 PROCEDURE — 97014 ELECTRIC STIMULATION THERAPY: CPT

## 2022-11-03 PROCEDURE — 97140 MANUAL THERAPY 1/> REGIONS: CPT

## 2022-11-03 NOTE — PROGRESS NOTES
PHYSICAL THERAPY - DAILY TREATMENT NOTE    Patient Name: Elizabeth Mijares        Date: 11/3/2022  : 2000   yes Patient  Verified  Visit #:     Insurance: Payor: Francheska Kimbrough / Plan: Cynthia Plascencia / Product Type: HMO /    In time: 10:00 Out time: 10:58   Total Treatment Time: 58     Medicare/BCBS Time Tracking (below)   Total Timed Codes (min):  48 1:1 Treatment Time: 46     TREATMENT AREA =  Right knee pain [M25.561]    SUBJECTIVE  Pain Level (on 0 to 10 scale):  0  / 10   Medication Changes/New allergies or changes in medical history, any new surgeries or procedures?    no  If yes, update Summary List   Subjective Functional Status/Changes:  []  No changes reported     It doesn't get bothered very much          OBJECTIVE  Modalities Rationale:     decrease pain to improve patient's ability to tolerate prolonged ambulation  10 min [] Estim, type/location: [place], 65 pps, 10/10 cycle; 5 sec ramp; R quad in long sit                                     []  att     [x]  unatt     []  w/US     [x]  w/ice    []  w/heat    min []  Mechanical Traction: type/lbs                   []  pro   []  sup   []  int   []  cont    []  before manual    []  after manual    min []  Ultrasound, settings/location:      min []  Iontophoresis w/ dexamethasone, location:                                               []  take home patch       []  in clinic   PD min []  Ice     []  Heat    location/position:     min []  Vasopneumatic Device, press/temp:     min []  Other:    [x] Skin assessment post-treatment (if applicable):    [x]  intact    []  redness- no adverse reaction     []redness - adverse reaction:        32  (Bill 30) min Therapeutic Exercise:  [x]  See flow sheet   Rationale:      increase ROM and increase strength to improve the patients ability to tolerate prolonged ambulation     8 min Manual Therapy: STM distal hamstring and quadriceps, supine R knee flexion PROM   Rationale:      decrease pain, increase ROM, increase tissue extensibility, decrease edema  and decrease trigger points to improve patient's ability to perform ADLs  The manual therapy interventions were performed at a separate and distinct time from the therapeutic activities interventions. 8 min Gait Training:  __30_ feet without 1 crutch wearing brace unlocked on level surfaces with _SBA__ level of assistance; NR navigation of small hurdles in // bars with light UEs    Rationale: To improve ambulation safety and efficiency in order to improve patient's ability to safely ambulate in various environments       Billed With/As:   [] TE   [] TA   [] Neuro   [] Self Care Patient Education: [x] Review HEP    [x] Progressed/Changed HEP based on:   [] positioning   [] body mechanics   [] transfers   [] heat/ice application    [] other:      Other Objective/Functional Measures:    R Knee AROM = 120 deg  Continues to require VCing for heel to toe gait mechanics during ambulation with 1 axillary crutch   Demonstrates slight circumduction with L LE leading and R LE following NR small buddy navigation  Added lateral small buddy naviagtion and mini squats   Moderate R lateral lean and use of UE taps to maintain balance during R SLS  Added mini squats (x3) with use of GI UEs and // bars for support with reports of anterior knee pain despite minimal lowering thus will modify to sit to stand NV     Post Treatment Pain Level (on 0 to 10) scale:  0 / 10     ASSESSMENT  Assessment/Changes in Function:     Reports of soreness in R knee t/o treatmnet likely due to recent initiation of WBing therex and decreasing support with ambulation from 2 crutches to 1 crutch.  Denies pain      []  See Progress Note/Recertification   Patient will continue to benefit from skilled PT services to modify and progress therapeutic interventions, address functional mobility deficits, address ROM deficits, address strength deficits, analyze and address soft tissue restrictions, analyze and cue movement patterns, analyze and modify body mechanics/ergonomics, assess and modify postural abnormalities and instruct in home and community integration to attain remaining goals. Progress toward goals / Updated goals:    Patient will report pain 3/10 at worst to improve tolerance to ADLs. R knee pain 3/10 at the worst (10/18/22)  Patient will 110 degrees of right knee flexion AROM to improve gait.  Goal Met R knee flexion ROM = 120 deg (10/27/22)       PLAN  [x]  Upgrade activities as tolerated yes Continue plan of care   []  Discharge due to :    []  Other:      Therapist: Bobbi Gregory    Date: 11/3/2022 Time: 12:24 PM     Future Appointments   Date Time Provider Nabeel Gordon   11/3/2022 10:00 AM [place]   11/7/2022 11:20 AM [facility]   11/9/2022 11:20 AM [facility]   11/14/2022 11:20 AM [place]   11/16/2022  7:40 AM [place] [facility]   11/21/2022 11:20 AM [place] [facility]   11/23/2022 11:20 AM [place] [facility]   11/28/2022 11:20 AM [place] [facility]   11/30/2022 11:20 AM [place] [facility]   12/5/2022 11:20 AM [place] [facility]   12/7/2022 11:20 AM [place] [facility]   12/12/2022 11:20 AM [place] [facility]   12/14/2022 11:20 AM Karla Romero [facility]

## 2022-11-07 ENCOUNTER — HOSPITAL ENCOUNTER (OUTPATIENT)
Dept: PHYSICAL THERAPY | Age: 22
Discharge: HOME | End: 2022-11-07
Payer: COMMERCIAL

## 2022-11-07 PROCEDURE — 97110 THERAPEUTIC EXERCISES: CPT

## 2022-11-07 PROCEDURE — 97014 ELECTRIC STIMULATION THERAPY: CPT

## 2022-11-07 NOTE — PROGRESS NOTES
PHYSICAL THERAPY - DAILY TREATMENT NOTE    Patient Name: Karla Metcalf        Date: 2022  : 2000   yes Patient  Verified  Visit #:   10 of   12  Insurance: Payor: Alisha Ceballos / Plan: Christi Zuniga / Product Type: HMO /    In time: 11:22 Out time: 12:10   Total Treatment Time: 48     Medicare/BCBS Time Tracking (below)   Total Timed Codes (min):  48 1:1 Treatment Time: 38     TREATMENT AREA =  Right knee pain [M25.561]    SUBJECTIVE  Pain Level (on 0 to 10 scale):  0  / 10   Medication Changes/New allergies or changes in medical history, any new surgeries or procedures?    no  If yes, update Summary List   Subjective Functional Status/Changes:  []  No changes reported     Its been hurting a little bit - and a little pain 2-3/10. More towards the end of the day. Im thinking doing more stuff its more tiered. It buckled Friday and popped and that irritated it - I could still walk afterwards.  The brace has fallen down and wasn't in place         OBJECTIVE  Modalities Rationale:     decrease pain to improve patient's ability to tolerate prolonged ambulation  10 min [x] Estim, type/location: [facility], 65 pps, 10/20 cycle; 2 sec ramp; R quad in long sit                                     []  att     [x]  unatt     []  w/US     [x]  w/ice    []  w/heat    min []  Mechanical Traction: type/lbs                   []  pro   []  sup   []  int   []  cont    []  before manual    []  after manual    min []  Ultrasound, settings/location:      min []  Iontophoresis w/ dexamethasone, location:                                               []  take home patch       []  in clinic   PD min []  Ice     []  Heat    location/position:     min []  Vasopneumatic Device, press/temp:     min []  Other:    [x] Skin assessment post-treatment (if applicable):    [x]  intact    []  redness- no adverse reaction     []redness - adverse reaction:        38 min Therapeutic Exercise:  [x]  See flow sheet   Rationale:      increase ROM and increase strength to improve the patients ability to tolerate prolonged ambulation     NT min Manual Therapy: STM distal hamstring and quadriceps, supine R knee flexion PROM   Rationale:      decrease pain, increase ROM, increase tissue extensibility, decrease edema  and decrease trigger points to improve patient's ability to perform ADLs  The manual therapy interventions were performed at a separate and distinct time from the therapeutic activities interventions. NT min Gait Training:  __30_ feet without 1 crutch wearing brace unlocked on level surfaces with _SBA__ level of assistance; NR navigation of small hurdles in // bars with light UEs    Rationale: To improve ambulation safety and efficiency in order to improve patient's ability to safely ambulate in various environments       Billed With/As:   [] TE   [] TA   [] Neuro   [] Self Care Patient Education: [x] Review HEP    [x] Progressed/Changed HEP based on:   [] positioning   [] body mechanics   [] transfers   [] heat/ice application    [] other:      Other Objective/Functional Measures:    See PN     Post Treatment Pain Level (on 0 to 10) scale:  0 / 10     ASSESSMENT  Assessment/Changes in Function:     See PN      []  See Progress Note/Recertification   Patient will continue to benefit from skilled PT services to modify and progress therapeutic interventions, address functional mobility deficits, address ROM deficits, address strength deficits, analyze and address soft tissue restrictions, analyze and cue movement patterns, analyze and modify body mechanics/ergonomics, assess and modify postural abnormalities and instruct in home and community integration to attain remaining goals.    Progress toward goals / Updated goals:    See PN       PLAN  [x]  Upgrade activities as tolerated yes Continue plan of care   []  Discharge due to :    []  Other:      Therapist: Grace Devlin    Date: 11/7/2022 Time: 12:24 PM     Future Appointments   Date Time Provider [place]   11/7/2022 11:20 AM Delores Devaughn [facility]   11/9/2022 11:20 AM Delores Devaughn [facility]   11/14/2022 11:20 AM Delores Devaughn [facility]   11/16/2022  7:40 AM Delores Devaughn [facility]   11/21/2022 11:20 AM Delores Devaughn [facility]   11/23/2022 11:20 AM Delores Devaughn [facility]   11/28/2022 11:20 AM Delores Devaughn [facility]   11/30/2022 11:20 AM Delores Devaughn [facility]   12/5/2022 11:20 AM Delores Devaughn [facility]   12/7/2022 11:20 AM Delores Devaughn [facility]   12/12/2022 11:20 AM Delores Devaughn [address] [facility]   12/14/2022 11:20 AM Antonella Romero [facility]

## 2022-11-07 NOTE — PROGRESS NOTES
[address] [place] [address], [address] [place] [address] - Phone: [phone]  Fax: [phone]  [x]  PROGRESS NOTE  []  DISCHARGE SUMMARY  Patient Name: Keila Art : 2000   Treatment Diagnosis: Right knee pain [M25.561]     Referral Source: Nereida Garcia,*     Date of Initial Visit: 10/05/2022 Attended Visits: 10 Missed Visits: 0     SUMMARY OF TREATMENT  Therapeutic exercises including ROM, strengthening, stretching, manual therapy including joint and soft tissue manipulation, balance training, modalities: [place], and HEP instruction. CURRENT STATUS  Patient is 6.5 weeks s/p R ACL Reconstruction (DOS 22). Currently, the patient's main complaint is infrequent R knee buckling. Recently on 22, patient reports R knee buckling and popping while ambulating around the house. She reports the brace wasn't aligned appropriately at the time of the knee buckling. Notes she was able to walk afterwards, however, notes increased soreness (3-/10) in the medial aspect of the right knee. Average right knee pain is rated at  0/10 and 3-4/10 at the worst. Right knee AROM is as follows: flexion 110 deg and extension +3 deg. Able to perform 2x10 SLRs without brace and demonstrates R quadriceps mm quivering. Pt reports improved ability to bend and straighten knee, tolerate prolonged ambulation, negotiate stairs since starting PT services. Pt would benefit from continued PT services in order to improve knee AROM/PROM, strength, flexibility, functional mobility, and address remaining impairments. Goal/Measure of Progress Goal Met? 1. Patient will report pain 3/10 at worst to improve tolerance to ADLs. Status at last Eval: Pain = 10/10 at the worst Current Status: Pain = 3-4/10 at the worst Progressing   2. Patient will increase FOTO Score to 46 points to improve tolerance to ADLs.    Status at last Eval: FOTO = 33/100 Current Status: FOTO = 56/100 yes   3. Patient will 110 degrees of right knee flexion AROM to improve gait. Status at last Eval: R Knee flexion = 70 deg Current Status: R Knee flexion = 110 yes     New Goals to be achieved in __3-4__  Weeks:  1. Patient will report pain 1/10 at worst to improve tolerance to ADLs. 2.  Patient will 120 degrees of right knee flexion AROM to improve tolerance to ADLs. 3.  Patient will increase FOTO Score to 61 points to improve tolerance to ADLs. RECOMMENDATIONS  Continue therapy with the following recommendations: 2x per week for 5-6 weeks    If you have any questions/comments please contact us directly at [phone]   Thank you for allowing us to assist in the care of your patient. Therapist Signature: Stephanie Parnell DPT Date: 11/7/2022     Time: 11:26 AM     NOTE TO PHYSICIAN:  PLEASE COMPLETE THE ORDERS BELOW AND FAX TO   [facility] [place] [facility]:Fax: [phone]  If you are unable to process this request in 24 hours please contact our office: [phone]      ___ I have read the above report and request that my patient continue as recommended.   ___ I have read the above report and request that my patient continue therapy with the following changes/special instructions:_________________________________________________________   ___ I have read the above report and request that my patient be discharged from therapy.      Physician Signature:        Date:       Time:                                       Ken Guard

## 2022-11-09 ENCOUNTER — HOSPITAL ENCOUNTER (OUTPATIENT)
Dept: PHYSICAL THERAPY | Age: 22
Discharge: HOME | End: 2022-11-09
Payer: COMMERCIAL

## 2022-11-09 PROCEDURE — 97110 THERAPEUTIC EXERCISES: CPT

## 2022-11-09 PROCEDURE — 97014 ELECTRIC STIMULATION THERAPY: CPT

## 2022-11-09 NOTE — PROGRESS NOTES
PHYSICAL THERAPY - DAILY TREATMENT NOTE    Patient Name: Keila Art        Date: 2022  : 2000   yes Patient  Verified  Visit #:   6 of    +12) 03  Insurance: Payor: Kirby Iraheta / Plan: Mary Schneider / Product Type: HMO /    In time: 11:22 Out time: 12:12   Total Treatment Time: 50     Medicare/BCBS Time Tracking (below)   Total Timed Codes (min):  50 1:1 Treatment Time: 40     TREATMENT AREA =  Right knee pain [M25.561]    SUBJECTIVE  Pain Level (on 0 to 10 scale):  0  / 10   Medication Changes/New allergies or changes in medical history, any new surgeries or procedures?    no  If yes, update Summary List   Subjective Functional Status/Changes:  []  No changes reported     Notes           OBJECTIVE  Modalities Rationale:     decrease pain to improve patient's ability to tolerate prolonged ambulation  10 min [x] Estim, type/location: [place], 50 pps, 10/20 cycle; 2 sec ramp; R quad in long sit                                     []  att     [x]  unatt     []  w/US     [x]  w/ice    []  w/heat    min []  Mechanical Traction: type/lbs                   []  pro   []  sup   []  int   []  cont    []  before manual    []  after manual    min []  Ultrasound, settings/location:      min []  Iontophoresis w/ dexamethasone, location:                                               []  take home patch       []  in clinic   PD min []  Ice     []  Heat    location/position:     min []  Vasopneumatic Device, press/temp:     min []  Other:    [x] Skin assessment post-treatment (if applicable):    [x]  intact    []  redness- no adverse reaction     []redness - adverse reaction:        40 min Therapeutic Exercise:  [x]  See flow sheet   Rationale:      increase ROM and increase strength to improve the patients ability to tolerate prolonged ambulation     NT min Manual Therapy: STM distal hamstring and quadriceps, supine R knee flexion PROM   Rationale:      decrease pain, increase ROM, increase tissue extensibility, decrease edema  and decrease trigger points to improve patient's ability to perform ADLs  The manual therapy interventions were performed at a separate and distinct time from the therapeutic activities interventions. NT min Gait Training:  __30_ feet without 1 crutch wearing brace unlocked on level surfaces with _SBA__ level of assistance; NR navigation of small hurdles in // bars with light UEs    Rationale: To improve ambulation safety and efficiency in order to improve patient's ability to safely ambulate in various environments       Billed With/As:   [] TE   [] TA   [] Neuro   [] Self Care Patient Education: [x] Review HEP    [x] Progressed/Changed HEP based on:   [] positioning   [] body mechanics   [] transfers   [] heat/ice application    [] other:      Other Objective/Functional Measures:    Progressed TKE with ball to TB  Advanced weight shift to SR     Post Treatment Pain Level (on 0 to 10) scale:  0 / 10     ASSESSMENT  Assessment/Changes in Function:     Able to resume full treatment session while only holding squatting with good tolerance. Patient still reporting R quadriceps fatigue t/o therex. []  See Progress Note/Recertification   Patient will continue to benefit from skilled PT services to modify and progress therapeutic interventions, address functional mobility deficits, address ROM deficits, address strength deficits, analyze and address soft tissue restrictions, analyze and cue movement patterns, analyze and modify body mechanics/ergonomics, assess and modify postural abnormalities and instruct in home and community integration to attain remaining goals. Progress toward goals / Updated goals:    Patient will 120 degrees of right knee flexion AROM to improve tolerance to ADLs.  Goal in progress          PLAN  [x]  Upgrade activities as tolerated yes Continue plan of care   []  Discharge due to :    []  Other:      Therapist: Hima Bower    Date: 11/9/2022 Time: 12:24 PM     Future Appointments   Date Time Provider Nabeel Gordon   11[facility]2022 11:20 [facility]   11/14/2022 11:20 AM  [facility]   11/16/2022  7:40 AM  [facility]   11/21/2022 11:20 AM  [facility]   11/23/2022 11:20 AM  [facility]   11/28/2022 11:20 AM  [facility]   11/30/2022 11:20 AM  [facility]   12/5/2022 11:20 AM  [facility]   12/7/2022 11:20 AM  [facility]   12/12/2022 11:20 AM  [facility]   12/14/2022 11:20 AM Isabelle Romero [facility]

## 2022-11-14 ENCOUNTER — APPOINTMENT (OUTPATIENT)
Dept: PHYSICAL THERAPY | Age: 22
End: 2022-11-14
Payer: COMMERCIAL

## 2022-11-14 ENCOUNTER — TELEPHONE (OUTPATIENT)
Dept: PHYSICAL THERAPY | Age: 22
End: 2022-11-14

## 2022-11-16 ENCOUNTER — HOSPITAL ENCOUNTER (OUTPATIENT)
Dept: PHYSICAL THERAPY | Age: 22
Discharge: HOME | End: 2022-11-16
Payer: COMMERCIAL

## 2022-11-16 ENCOUNTER — APPOINTMENT (OUTPATIENT)
Dept: PHYSICAL THERAPY | Age: 22
End: 2022-11-16
Payer: COMMERCIAL

## 2022-11-16 PROCEDURE — 97014 ELECTRIC STIMULATION THERAPY: CPT

## 2022-11-16 PROCEDURE — 97110 THERAPEUTIC EXERCISES: CPT

## 2022-11-16 PROCEDURE — 97140 MANUAL THERAPY 1/> REGIONS: CPT

## 2022-11-16 NOTE — PROGRESS NOTES
PHYSICAL THERAPY - DAILY TREATMENT NOTE    Patient Name: Emily Escobar        Date: 2022  : 2000   yes Patient  Verified  Visit #:   12 of   (12 +12) 24  Insurance: Payor: Shawn Olson / Plan: Micah Kelly / Product Type: HMO /    In time: 7:42 Out time: 8:53   Total Treatment Time: 71     Medicare/BCBS Time Tracking (below)   Total Timed Codes (min):  61 1:1 Treatment Time: 38     TREATMENT AREA =  Right knee pain [M25.561]    SUBJECTIVE  Pain Level (on 0 to 10 scale):  4  / 10 L/S; 0/10   Medication Changes/New allergies or changes in medical history, any new surgeries or procedures?    no  If yes, update Summary List   Subjective Functional Status/Changes:  []  No changes reported     My back is messed up - I think it happened in the night; I slept on the couch a few nights ago. Been getting sharp pain in the knee - very random and its very deep.  Sting for a sec and radiates and then its fine - not constant; been xmas shopping a lot - nothing severe or not manageable          OBJECTIVE  Modalities Rationale:     decrease pain to improve patient's ability to tolerate prolonged ambulation  10 min [x] Estim, type/location: [place], 50 pps, 10/20 cycle; 2 sec ramp; R quad in long sit                                     []  att     [x]  unatt     []  w/US     [x]  w/ice    []  w/heat    min []  Mechanical Traction: type/lbs                   []  pro   []  sup   []  int   []  cont    []  before manual    []  after manual    min []  Ultrasound, settings/location:      min []  Iontophoresis w/ dexamethasone, location:                                               []  take home patch       []  in clinic   PD min []  Ice     []  Heat    location/position:     min []  Vasopneumatic Device, press/temp:     min []  Other:    [x] Skin assessment post-treatment (if applicable):    [x]  intact    []  redness- no adverse reaction     []redness - adverse reaction:        52 (31) min Therapeutic Exercise: [x]  See flow sheet   Rationale:      increase ROM and increase strength to improve the patients ability to tolerate prolonged ambulation     9 min Manual Therapy: STM distal hamstring and GI L/S paraspinals; prone quad stretch    Rationale:      decrease pain, increase ROM, increase tissue extensibility, decrease edema  and decrease trigger points to improve patient's ability to perform ADLs  The manual therapy interventions were performed at a separate and distinct time from the therapeutic activities interventions. NT min Gait Training:  __30_ feet without 1 crutch wearing brace unlocked on level surfaces with _SBA__ level of assistance; NR navigation of small hurdles in // bars with light UEs    Rationale: To improve ambulation safety and efficiency in order to improve patient's ability to safely ambulate in various environments       Billed With/As:   [] TE   [] TA   [] Neuro   [] Self Care Patient Education: [x] Review HEP    [x] Progressed/Changed HEP based on:   [] positioning   [] body mechanics   [] transfers   [] heat/ice application    [] other:      Other Objective/Functional Measures:    Held performance of prone hip extension secondary to LBP  Added 2# ankle weight to S/L hip ABD and SLR   Notes clicking with straightening to bending  R knee ROM = +4 to 121 deg     Post Treatment Pain Level (on 0 to 10) scale:  6 / 10     ASSESSMENT  Assessment/Changes in Function:     Modified treatment secondary to now onset of LBP - plan to resume full treatment NV pending presentation. Patient education in activity modification with return to [employer]. Increased LBP noted following lying in supine with ESTIM [place].      []  See Progress Note/Recertification   Patient will continue to benefit from skilled PT services to modify and progress therapeutic interventions, address functional mobility deficits, address ROM deficits, address strength deficits, analyze and address soft tissue restrictions, analyze and cue movement patterns, analyze and modify body mechanics/ergonomics, assess and modify postural abnormalities and instruct in home and community integration to attain remaining goals. Progress toward goals / Updated goals:    Patient will 120 degrees of right knee flexion AROM to improve tolerance to ADLs.  Goal in progress +4 to 121 deg (11/16/22)         PLAN  [x]  Upgrade activities as tolerated yes Continue plan of care   []  Discharge due to :    []  Other:      Therapist: Christina Sheppard    Date: 11/16/2022 Time: 12:24 PM     Future Appointments   Date Time Provider Nabeel Gordon   11/16/2022  7:40 AM Clarance Mcnair [facility]   11/21/2022 11:20 AM Clarance Mcnair [facility]   11/23/2022 11:20 AM Clarance Mcnair [address] [facility]   11/28/2022 11:20 AM Clarance Mcnair [address] [facility]   11/30/2022 11:20 AM Clarance Mcnair [address] [facility]   12/5/2022 11:20 AM Clarance Mcnair [address] [facility]   12/7/2022 11:20 AM Clarance Mcnair [address] [facility]   12/12/2022 11:20 AM Clarance Mcnair [address] [facility]   12/14/2022 11:20 AM Courson, Sissy Cordia [address] [facility]

## 2022-11-21 ENCOUNTER — APPOINTMENT (OUTPATIENT)
Dept: PHYSICAL THERAPY | Age: 22
End: 2022-11-21
Payer: COMMERCIAL

## 2022-11-21 ENCOUNTER — HOSPITAL ENCOUNTER (OUTPATIENT)
Dept: PHYSICAL THERAPY | Age: 22
Discharge: HOME | End: 2022-11-21
Payer: COMMERCIAL

## 2022-11-21 PROCEDURE — 97110 THERAPEUTIC EXERCISES: CPT

## 2022-11-21 PROCEDURE — 97116 GAIT TRAINING THERAPY: CPT

## 2022-11-21 NOTE — PROGRESS NOTES
PHYSICAL THERAPY - DAILY TREATMENT NOTE    Patient Name: Jolena Mcburney        Date: 2022  : 2000   yes Patient  Verified  Visit #:   15 of   (12 +12) 24  Insurance: Payor: Christiana Hernandez / Plan: Ene Arteaga / Product Type: HMO /    In time: 11:22 Out time: 12:15   Total Treatment Time: 53     Medicare/BCBS Time Tracking (below)   Total Timed Codes (min):  53 1:1 Treatment Time: 39     TREATMENT AREA =  Right knee pain [M25.561]    SUBJECTIVE  Pain Level (on 0 to 10 scale):  0  / 10   Medication Changes/New allergies or changes in medical history, any new surgeries or procedures?    no  If yes, update Summary List   Subjective Functional Status/Changes:  []  No changes reported     Just ache          OBJECTIVE  Modalities Rationale:     decrease pain to improve patient's ability to tolerate prolonged ambulation  NT min [x] Estim, type/location: [place], 50 pps, 10/20 cycle; 2 sec ramp; R quad in long sit                                     []  att     [x]  unatt     []  w/US     [x]  w/ice    []  w/heat    min []  Mechanical Traction: type/lbs                   []  pro   []  sup   []  int   []  cont    []  before manual    []  after manual    min []  Ultrasound, settings/location:      min []  Iontophoresis w/ dexamethasone, location:                                               []  take home patch       []  in clinic   PD min []  Ice     []  Heat    location/position:     min []  Vasopneumatic Device, press/temp:     min []  Other:    [x] Skin assessment post-treatment (if applicable):    [x]  intact    []  redness- no adverse reaction     []redness - adverse reaction:        45  (31) min Therapeutic Exercise:  [x]  See flow sheet   Rationale:      increase ROM and increase strength to improve the patients ability to tolerate prolonged ambulation     NT min Manual Therapy: STM distal hamstring and GI L/S paraspinals; prone quad stretch    Rationale:      decrease pain, increase ROM, increase tissue extensibility, decrease edema  and decrease trigger points to improve patient's ability to perform ADLs  The manual therapy interventions were performed at a separate and distinct time from the therapeutic activities interventions. 8 min Gait Training:  __30_ feet without 1 crutch wearing brace unlocked on level surfaces with _SBA__ level of assistance; step ups on 8\" step, TM walking   Rationale: To improve ambulation safety and efficiency in order to improve patient's ability to safely ambulate in various environments       Billed With/As:   [] TE   [] TA   [] Neuro   [] Self Care Patient Education: [x] Review HEP    [x] Progressed/Changed HEP based on:   [] positioning   [] body mechanics   [] transfers   [] heat/ice application    [] other:      Other Objective/Functional Measures:    Patient education in gait mechanics to avoid incr supination with heel off and incr R knee flexion following heel off to avoid heel whip   Slight weight shift to the left with descending from squat   Added TM walking  R knee ROM = +3 to 125 deg     Post Treatment Pain Level (on 0 to 10) scale:  0/ 10     ASSESSMENT  Assessment/Changes in Function:     Increased emphasis on gait mechanics with amublation. Did discuss ambulation household distances in brace without crutch secondary to increases in quad strength. Good tolerance for addition of mini squats at high low table which previously increased R knee pain. []  See Progress Note/Recertification   Patient will continue to benefit from skilled PT services to modify and progress therapeutic interventions, address functional mobility deficits, address ROM deficits, address strength deficits, analyze and address soft tissue restrictions, analyze and cue movement patterns, analyze and modify body mechanics/ergonomics, assess and modify postural abnormalities and instruct in home and community integration to attain remaining goals.    Progress toward goals / Updated goals:    Patient will 120 degrees of right knee flexion AROM to improve tolerance to ADLs.  Goal in progress +3 to 125 deg (11/21/22)         PLAN  [x]  Upgrade activities as tolerated yes Continue plan of care   []  Discharge due to :    []  Other:      Therapist: Marge Pulido    Date: 11/21/2022 Time: 12:24 PM     Future Appointments   Date Time Provider Nabeel Gordon   11/21/2022 11:20 AM Daren Maroon [facility]   11/23/2022 11:20 AM Daren Maroon [address] [facility]   11/28/2022 11:20 AM Daren Maroon [address] [facility]   11/30/2022 11:20 AM Daren Maroon [address] [facility]   12/5/2022 11:20 AM Daren Maroon [address] [facility]   12/7/2022 11:20 AM Daren Maroon [address] [facility]   12/12/2022 11:20 AM Daren Maroon [address] [facility]   12/14/2022 11:20 AM Courson, Rich Hamman [address] [facility]

## 2022-11-23 ENCOUNTER — APPOINTMENT (OUTPATIENT)
Dept: PHYSICAL THERAPY | Age: 22
End: 2022-11-23
Payer: COMMERCIAL

## 2022-11-23 ENCOUNTER — HOSPITAL ENCOUNTER (OUTPATIENT)
Dept: PHYSICAL THERAPY | Age: 22
Discharge: HOME | End: 2022-11-23
Payer: COMMERCIAL

## 2022-11-23 PROCEDURE — 97014 ELECTRIC STIMULATION THERAPY: CPT

## 2022-11-23 PROCEDURE — 97116 GAIT TRAINING THERAPY: CPT

## 2022-11-23 PROCEDURE — 97110 THERAPEUTIC EXERCISES: CPT

## 2022-11-23 NOTE — PROGRESS NOTES
PHYSICAL THERAPY - DAILY TREATMENT NOTE    Patient Name: Ney Heller        Date: 2022  : 2000   yes Patient  Verified  Visit #:   15 of   ) 40  Insurance: Payor: Katie Reyes / Plan: Gabriele BorderJump / Product Type: HMO /      In time: 11:25 Out time: 12:17   Total Treatment Time: 52     Medicare/BCBS Time Tracking (below)   Total Timed Codes (min):  52 1:1 Treatment Time: 42     TREATMENT AREA =  Right knee pain [M25.561]    SUBJECTIVE  Pain Level (on 0 to 10 scale):  1-2  / 10   Medication Changes/New allergies or changes in medical history, any new surgeries or procedures?    no  If yes, update Summary List   Subjective Functional Status/Changes:  []  No changes reported     Notes soreness in the right knee and hamstring, pain radiates into the shin  Denies buckling with increased fatigue while walking without brace at home        OBJECTIVE  Modalities Rationale:     decrease pain to improve patient's ability to tolerate prolonged ambulation  10 min [x] Estim, type/location: [place], 50 pps, 10/20 cycle; 2 sec ramp; R quad in long sit                                     []  att     [x]  unatt     []  w/US     [x]  w/ice    []  w/heat    min []  Mechanical Traction: type/lbs                   []  pro   []  sup   []  int   []  cont    []  before manual    []  after manual    min []  Ultrasound, settings/location:      min []  Iontophoresis w/ dexamethasone, location:                                               []  take home patch       []  in clinic   PD min []  Ice     []  Heat    location/position:     min []  Vasopneumatic Device, press/temp:     min []  Other:    [x] Skin assessment post-treatment (if applicable):    [x]  intact    []  redness- no adverse reaction     []redness - adverse reaction:        34 min Therapeutic Exercise:  [x]  See flow sheet   Rationale:      increase ROM and increase strength to improve the patients ability to tolerate prolonged ambulation     NT min Manual Therapy: STM distal hamstring and GI L/S paraspinals; prone quad stretch    Rationale:      decrease pain, increase ROM, increase tissue extensibility, decrease edema  and decrease trigger points to improve patient's ability to perform ADLs  The manual therapy interventions were performed at a separate and distinct time from the therapeutic activities interventions. 8 min Gait Training:  __30_ feet without 1 crutch wearing brace unlocked on level surfaces with _SBA__ level of assistance; step ups on 8\" step, TM walking   Rationale: To improve ambulation safety and efficiency in order to improve patient's ability to safely ambulate in various environments       Billed With/As:   [] TE   [] TA   [] Neuro   [] Self Care Patient Education: [x] Review HEP    [x] Progressed/Changed HEP based on:   [] positioning   [] body mechanics   [] transfers   [] heat/ice application    [] other:      Other Objective/Functional Measures: Added heel digs for gentle hamstring strengthening   Improved gait mechanics with ambulation as patient ambulates on TM with very infrequent heel whip     Post Treatment Pain Level (on 0 to 10) scale:  0/ 10     ASSESSMENT  Assessment/Changes in Function:     Pt 9 weeks post ACL reconstruction. Continued with current regimen secondary to increased R knee and LE soreness s/p progressions in treatment following last visit. []  See Progress Note/Recertification   Patient will continue to benefit from skilled PT services to modify and progress therapeutic interventions, address functional mobility deficits, address ROM deficits, address strength deficits, analyze and address soft tissue restrictions, analyze and cue movement patterns, analyze and modify body mechanics/ergonomics, assess and modify postural abnormalities and instruct in home and community integration to attain remaining goals.    Progress toward goals / Updated goals:    Patient will 120 degrees of right knee flexion AROM to improve tolerance to ADLs.  Goal in progress +3 to 125 deg (11/21/22)         PLAN  [x]  Upgrade activities as tolerated yes Continue plan of care   []  Discharge due to :    []  Other:      Therapist: Félix Estrada    Date: 11/23/2022 Time: 12:24 PM     Future Appointments   Date Time Provider Nabeel Gordon   11/28/2022 11:20 AM Darby Hartman [facility]   11/30/2022 11:20 AM Darby Hartman [facility]   12/5/2022 11:20 AM Darby Hartamn [facility]   12/12/2022 11:20 AM Darby Hartman [facility]   12/14/2022 11:20 AM Demar Romero [facility]

## 2022-11-28 ENCOUNTER — APPOINTMENT (OUTPATIENT)
Dept: PHYSICAL THERAPY | Age: 22
End: 2022-11-28
Payer: COMMERCIAL

## 2022-11-28 ENCOUNTER — HOSPITAL ENCOUNTER (OUTPATIENT)
Dept: PHYSICAL THERAPY | Age: 22
Discharge: HOME | End: 2022-11-28
Payer: COMMERCIAL

## 2022-11-28 PROCEDURE — 97140 MANUAL THERAPY 1/> REGIONS: CPT

## 2022-11-28 PROCEDURE — 97110 THERAPEUTIC EXERCISES: CPT

## 2022-11-28 NOTE — PROGRESS NOTES
PHYSICAL THERAPY - DAILY TREATMENT NOTE    Patient Name: Liliana Hatfield        Date: 2022  : 2000   yes Patient  Verified  Visit #:   13 of   12 +12) 08  Insurance: Payor: Roman Lei / Plan: Mortimer Laity / Product Type: HMO /      In time: 11:23 Out time: 12:13   Total Treatment Time: 50     Medicare/BCBS Time Tracking (below)   Total Timed Codes (min):  50 1:1 Treatment Time: 38     TREATMENT AREA =  Right knee pain [M25.561]    SUBJECTIVE  Pain Level (on 0 to 10 scale):  0  / 10   Medication Changes/New allergies or changes in medical history, any new surgeries or procedures?    no  If yes, update Summary List   Subjective Functional Status/Changes:  []  No changes reported     Its feeling fine, knee might have buckled this morning walking walking in between po stuff           OBJECTIVE  Modalities Rationale:     decrease pain to improve patient's ability to tolerate prolonged ambulation   min [x] Estim, type/location: [place], 50 pps, 10/20 cycle; 2 sec ramp; R quad in long sit                                     []  att     [x]  unatt     []  w/US     [x]  w/ice    []  w/heat    min []  Mechanical Traction: type/lbs                   []  pro   []  sup   []  int   []  cont    []  before manual    []  after manual    min []  Ultrasound, settings/location:      min []  Iontophoresis w/ dexamethasone, location:                                               []  take home patch       []  in clinic   PD min []  Ice     []  Heat    location/position:     min []  Vasopneumatic Device, press/temp:     min []  Other:    [x] Skin assessment post-treatment (if applicable):    [x]  intact    []  redness- no adverse reaction     []redness - adverse reaction:        42 (bill 30) min Therapeutic Exercise:  [x]  See flow sheet   Rationale:      increase ROM and increase strength to improve the patients ability to tolerate prolonged ambulation     8 min Manual Therapy: STM distal hamstring Rationale:      decrease pain, increase ROM, increase tissue extensibility, decrease edema  and decrease trigger points to improve patient's ability to perform ADLs  The manual therapy interventions were performed at a separate and distinct time from the therapeutic activities interventions. NT min Gait Training:  __30_ feet without 1 crutch wearing brace unlocked on level surfaces with _SBA__ level of assistance; step ups on 8\" step, TM walking   Rationale: To improve ambulation safety and efficiency in order to improve patient's ability to safely ambulate in various environments       Billed With/As:   [] TE   [] TA   [] Neuro   [] Self Care Patient Education: [x] Review HEP    [x] Progressed/Changed HEP based on:   [] positioning   [] body mechanics   [] transfers   [] heat/ice application    [] other:      Other Objective/Functional Measures: Added hamstring curls and leg press   Able to increased TM speed to 2.0 mph      Post Treatment Pain Level (on 0 to 10) scale:  0/ 10     ASSESSMENT  Assessment/Changes in Function:     Patient is nearly 10 weeks post R ACL reconstruction - advanced quad strengthening as appropriate. []  See Progress Note/Recertification   Patient will continue to benefit from skilled PT services to modify and progress therapeutic interventions, address functional mobility deficits, address ROM deficits, address strength deficits, analyze and address soft tissue restrictions, analyze and cue movement patterns, analyze and modify body mechanics/ergonomics, assess and modify postural abnormalities and instruct in home and community integration to attain remaining goals. Progress toward goals / Updated goals:    Patient will 120 degrees of right knee flexion AROM to improve tolerance to ADLs.  Goal in progress +3 to 125 deg (11/21/22)         PLAN  [x]  Upgrade activities as tolerated yes Continue plan of care   []  Discharge due to :    []  Other:      Therapist: Isabelle Mason Courson    Date: 11/28/2022 Time: 12:24 PM     Future Appointments   Date Time Provider Nabeel Heidi   11/28/2022 11:20 AM Amarilys Leisure [facility]   11/30/2022 11:20 AM ReZina becerril [facility]   12/5/2022  [facility]   12/6/2022  2:25 [facility]   12/8/2022  2:25 PM [facility]   12/12/2022  5:20 PM [facility]   12/14/2022  2:25 PM Heather[facility]   12/19/2022  5:20 [facility]   12/21/2022  2:25 PM Courson[facility]   12/28/2022  2:25 [facility]   1/4/2023  2:30 PM [facility]ina [facility]   1/9/2023  5:30 PM Amarilys Leisure [facility]   1/11/2023  2:30 PM Amarilys Leisure [facility]   1/16/2023  5:30 PM Amarilys Leisure [facility]   1/18/2023  2:30 PM Amarilys Leisure [facility]   1/23/2023  5:30 PM Amarilys Leisure [facility]   1/25/2023  2:30 PM Zina Romero [facility]

## 2022-11-30 ENCOUNTER — APPOINTMENT (OUTPATIENT)
Dept: PHYSICAL THERAPY | Age: 22
End: 2022-11-30
Payer: COMMERCIAL

## 2022-12-05 ENCOUNTER — APPOINTMENT (OUTPATIENT)
Dept: PHYSICAL THERAPY | Age: 22
End: 2022-12-05
Payer: COMMERCIAL

## 2022-12-05 ENCOUNTER — HOSPITAL ENCOUNTER (OUTPATIENT)
Dept: PHYSICAL THERAPY | Age: 22
Discharge: HOME | End: 2022-12-05
Payer: COMMERCIAL

## 2022-12-05 PROCEDURE — 97110 THERAPEUTIC EXERCISES: CPT

## 2022-12-05 NOTE — PROGRESS NOTES
PHYSICAL THERAPY - DAILY TREATMENT NOTE    Patient Name: Arely Blankenship        Date: 2022  : 2000   yes Patient  Verified  Visit #:   12 of   12 +12 24  Insurance: Payor: Mehrdad Narayan / Plan: Yogesh Chol / Product Type: HMO /      In time: 2:27 Out time: 3:16   Total Treatment Time: 49     Medicare/BCBS Time Tracking (below)   Total Timed Codes (min):  39 1:1 Treatment Time: 39     TREATMENT AREA =  Right knee pain [M25.561]    SUBJECTIVE  Pain Level (on 0 to 10 scale):  0  / 10   Medication Changes/New allergies or changes in medical history, any new surgeries or procedures?    no  If yes, update Summary List   Subjective Functional Status/Changes:  []  No changes reported     Hasnt happened lately, still clicking;  Thursday pain in the shin down the ankle and thigh was hurting - took tylenol  Notes pulling the left hamstring         OBJECTIVE  Modalities Rationale:     decrease pain to improve patient's ability to tolerate prolonged ambulation   min [x] Estim, type/location:                                      []  att     [x]  unatt     []  w/US     [x]  w/ice    []  w/heat    min []  Mechanical Traction: type/lbs                   []  pro   []  sup   []  int   []  cont    []  before manual    []  after manual    min []  Ultrasound, settings/location:      min []  Iontophoresis w/ dexamethasone, location:                                               []  take home patch       []  in clinic   PD min []  Ice     []  Heat    location/position:     min []  Vasopneumatic Device, press/temp:     min []  Other:    [x] Skin assessment post-treatment (if applicable):    [x]  intact    []  redness- no adverse reaction     []redness - adverse reaction:        39 min Therapeutic Exercise:  [x]  See flow sheet   Rationale:      increase ROM and increase strength to improve the patients ability to tolerate prolonged ambulation     NT min Manual Therapy: STM distal hamstring    Rationale:      decrease pain, increase ROM, increase tissue extensibility, decrease edema  and decrease trigger points to improve patient's ability to perform ADLs  The manual therapy interventions were performed at a separate and distinct time from the therapeutic activities interventions. NT min Gait Training:  __30_ feet without 1 crutch wearing brace unlocked on level surfaces with _SBA__ level of assistance; step ups on 8\" step, TM walking   Rationale: To improve ambulation safety and efficiency in order to improve patient's ability to safely ambulate in various environments       Billed With/As:   [] TE   [] TA   [] Neuro   [] Self Care Patient Education: [x] Review HEP    [x] Progressed/Changed HEP based on:   [] positioning   [] body mechanics   [] transfers   [] heat/ice application    [] other:      Other Objective/Functional Measures:    Demonstrates equal WBing t/o squatting; able to progress sit to stand to squat with TRX  Progressed step height to 10\" with good tolerance  Improved buddy navigation reciprocally   Added mini band lateral walks     Post Treatment Pain Level (on 0 to 10) scale:  1/ 10     ASSESSMENT  Assessment/Changes in Function:     Modified session secondary to late arrival. PT session performed out of brace with fair tolerance reporting increased post exercise soreness. []  See Progress Note/Recertification   Patient will continue to benefit from skilled PT services to modify and progress therapeutic interventions, address functional mobility deficits, address ROM deficits, address strength deficits, analyze and address soft tissue restrictions, analyze and cue movement patterns, analyze and modify body mechanics/ergonomics, assess and modify postural abnormalities and instruct in home and community integration to attain remaining goals. Progress toward goals / Updated goals:    Patient will 120 degrees of right knee flexion AROM to improve tolerance to ADLs.  Goal in progress +3 to 125 deg (11/21/22)         PLAN  [x]  Upgrade activities as tolerated yes Continue plan of care   []  Discharge due to :    []  Other:      Therapist: Brandon Green    Date: 12/5/2022 Time: 12:24 PM     Future Appointments   Date Time Provider Nabeel Gordon   12/5/2022  2:20 PM Venessa Calle [facility]   12/6/2022  2:25 PM [facility]enia Romero [facility]   12/8/2022  2:25 PM CoursYesenia becerril [facility]   12/12/2022  5:[facility]   12/14/2022  2:25 PM [facility]   12/19/2022  5:20 PM [facility]   12/21/2022  2:25 PM [facility]   12/28/2022  2:25 PM Yesenia Romero [facility]   1/4/2023  2:30 PM Venessa Calle [facility]   1/9/2023  5:30 PM Venessa Calle [facility]   1/11/2023  2:30 PM Venessa Calle [facility]   1/16/2023  5:30 PM Venessa Calle [facility]   1/18/2023  2:30 PM [facility]   1/23/2023  5:30 PM [facility]enessa [facility]   1/25/2023  2:30 PM Yesenia Romero [facility]

## 2022-12-06 ENCOUNTER — APPOINTMENT (OUTPATIENT)
Dept: PHYSICAL THERAPY | Age: 22
End: 2022-12-06
Payer: COMMERCIAL

## 2022-12-07 ENCOUNTER — APPOINTMENT (OUTPATIENT)
Dept: PHYSICAL THERAPY | Age: 22
End: 2022-12-07
Payer: COMMERCIAL

## 2022-12-08 ENCOUNTER — HOSPITAL ENCOUNTER (OUTPATIENT)
Dept: PHYSICAL THERAPY | Age: 22
Discharge: HOME | End: 2022-12-08
Payer: COMMERCIAL

## 2022-12-08 PROCEDURE — 97110 THERAPEUTIC EXERCISES: CPT

## 2022-12-08 NOTE — PROGRESS NOTES
PHYSICAL THERAPY - DAILY TREATMENT NOTE  Patient Name: Nir Cedillo        Date: 2022  : 2000   yes Patient  Verified  Visit #:   16 of   12 +12) 81  Insurance: Payor: Elidia Gagnon / Plan: Andrea Clancy / Product Type: HMO /      In time: 2:25 Out time: 3:16   Total Treatment Time: 51     Medicare/[facility] Time Tracking (below)    Total Timed Codes (min):  51 1:1 Treatment Time: 36     TREATMENT AREA =  Right knee pain [M25.561]    SUBJECTIVE  Pain Level (on 0 to 10 scale):  4   / 10   Medication Changes/New allergies or changes in medical history, any new surgeries or procedures?    no  If yes, update Summary List   Subjective Functional Status/Changes:  []  No changes reported     Notes being very sore after last time    Feels hamstring move - not painful   Uses knee brace        OBJECTIVE  Modalities Rationale:     decrease pain to improve patient's ability to tolerate prolonged ambulation   min [x] Estim, type/location:                                      []  att     [x]  unatt     []  w/US     [x]  w/ice    []  w/heat    min []  Mechanical Traction: type/lbs                   []  pro   []  sup   []  int   []  cont    []  before manual    []  after manual    min []  Ultrasound, settings/location:      min []  Iontophoresis w/ dexamethasone, location:                                               []  take home patch       []  in clinic   PD min []  Ice     []  Heat    location/position:     min []  Vasopneumatic Device, press/temp:     min []  Other:    [x] Skin assessment post-treatment (if applicable):    [x]  intact    []  redness- no adverse reaction     []redness - adverse reaction:        51 (bill 36) min Therapeutic Exercise:  [x]  See flow sheet   Rationale:      increase ROM and increase strength to improve the patients ability to tolerate prolonged ambulation     NT min Manual Therapy: STM distal hamstring    Rationale:      decrease pain, increase ROM, increase tissue extensibility, decrease edema  and decrease trigger points to improve patient's ability to perform ADLs  The manual therapy interventions were performed at a separate and distinct time from the therapeutic activities interventions. NT min Gait Training:  __30_ feet without 1 crutch wearing brace unlocked on level surfaces with _SBA__ level of assistance; step ups on 8\" step, TM walking   Rationale: To improve ambulation safety and efficiency in order to improve patient's ability to safely ambulate in various environments       Billed With/As:   [] TE   [] TA   [] Neuro   [] Self Care Patient Education: [x] Review HEP    [x] Progressed/Changed HEP based on:   [] positioning   [] body mechanics   [] transfers   [] heat/ice application    [] other:      Other Objective/Functional Measures:    Cuing for squatting mechanics and decreasing depth of squat      Post Treatment Pain Level (on 0 to 10) scale:  2/ 10     ASSESSMENT  Assessment/Changes in Function:     Good tolerance for performance of therex outside the brace with 2/10 post treatment being described as mm soreness. Denies knee buckling t/o treatment. Held progressions in therex secondary to incr soreness s/p last treatment      []  See Progress Note/Recertification   Patient will continue to benefit from skilled PT services to modify and progress therapeutic interventions, address functional mobility deficits, address ROM deficits, address strength deficits, analyze and address soft tissue restrictions, analyze and cue movement patterns, analyze and modify body mechanics/ergonomics, assess and modify postural abnormalities and instruct in home and community integration to attain remaining goals. Progress toward goals / Updated goals:    Patient will 120 degrees of right knee flexion AROM to improve tolerance to ADLs.  Goal in progress +3 to 125 deg (11/21/22)       PLAN  [x]  Upgrade activities as tolerated yes Continue plan of care   []  Discharge due to :    []  Other:      Therapist: Yara Santizo    Date: 12/8/2022 Time: 12:24 PM     Future Appointments   Date Time Provider Nabeel Gordon   12/8/2022  2:25 PM Manuelita Whittington [facility]   12/12/2022  5:20 PM Thanh Romero [address] [facility]   12/14/2022  2:25 PM Thanh Romero [facility]   12/19/2022  5:20 PM Thanh Romero [address] [facility]   12/21/2022  2:25 PM Thanh Romero [address] [facility]   12/28/2022  2:25 PM Thanh Romero [address] [facility]   1/4/2023  2:30 PM Manuelita Whittington [facility]   1/9/2023  5:30 PM Manuelita Whittington [address] [facility]   1/11/2023  2:30 PM Manuelita Whittington [address] [facility]   1/16/2023  5:30 PM Manuelita Whittington [address] [facility]   1/18/2023  2:30 PM Manuelita Whittington [address] [facility]   1/23/2023  5:30 PM Manuelita Whittington [address] [facility]   1/25/2023  2:30 PM Thanh Romero [address] [facility]

## 2022-12-12 ENCOUNTER — APPOINTMENT (OUTPATIENT)
Dept: PHYSICAL THERAPY | Age: 22
End: 2022-12-12
Payer: COMMERCIAL

## 2022-12-12 ENCOUNTER — HOSPITAL ENCOUNTER (OUTPATIENT)
Dept: PHYSICAL THERAPY | Age: 22
Discharge: HOME | End: 2022-12-12
Payer: COMMERCIAL

## 2022-12-12 PROCEDURE — 97140 MANUAL THERAPY 1/> REGIONS: CPT

## 2022-12-12 PROCEDURE — 97110 THERAPEUTIC EXERCISES: CPT

## 2022-12-12 NOTE — PROGRESS NOTES
PHYSICAL THERAPY - DAILY TREATMENT NOTE  Patient Name: Elizabeth Mijares        Date: 2022  : 2000   yes Patient  Verified  Visit #:   18of   (12 +12) 24  Insurance: Payor: Francheska Kimbrough / Plan: Cynthia Plascencia / Product Type: HMO /      In time: 3:41 Out time: 4:20   Total Treatment Time: 39     Medicare/BCBS Time Tracking (below)   Total Timed Codes (min):  39 1:1 Treatment Time: 39     TREATMENT AREA =  Right knee pain [M25.561]    SUBJECTIVE  Pain Level (on 0 to 10 scale):  0  / 10   Medication Changes/New allergies or changes in medical history, any new surgeries or procedures?    no  If yes, update Summary List   Subjective Functional Status/Changes:  []  No changes reported     Been weeks where the knee hasnt buckled.  walked a lot.  Havent had any pain and been emma to do a lot of walking         OBJECTIVE  Modalities Rationale:     decrease pain to improve patient's ability to tolerate prolonged ambulation   min [x] Estim, type/location:                                      []  att     [x]  unatt     []  w/US     [x]  w/ice    []  w/heat    min []  Mechanical Traction: type/lbs                   []  pro   []  sup   []  int   []  cont    []  before manual    []  after manual    min []  Ultrasound, settings/location:      min []  Iontophoresis w/ dexamethasone, location:                                               []  take home patch       []  in clinic   PD min []  Ice     []  Heat    location/position:     min []  Vasopneumatic Device, press/temp:     min []  Other:    [x] Skin assessment post-treatment (if applicable):    [x]  intact    []  redness- no adverse reaction     []redness - adverse reaction:        39  min Therapeutic Exercise:  [x]  See flow sheet   Rationale:      increase ROM and increase strength to improve the patients ability to tolerate prolonged ambulation     NT min Manual Therapy: STM distal hamstring    Rationale:      decrease pain, increase ROM, increase tissue extensibility, decrease edema  and decrease trigger points to improve patient's ability to perform ADLs  The manual therapy interventions were performed at a separate and distinct time from the therapeutic activities interventions. NT min Gait Training:  __30_ feet without 1 crutch wearing brace unlocked on level surfaces with _SBA__ level of assistance; step ups on 8\" step, TM walking   Rationale: To improve ambulation safety and efficiency in order to improve patient's ability to safely ambulate in various environments       Billed With/As:   [] TE   [] TA   [] Neuro   [] Self Care Patient Education: [x] Review HEP    [x] Progressed/Changed HEP based on:   [] positioning   [] body mechanics   [] transfers   [] heat/ice application    [] other:      Other Objective/Functional Measures:    Improved ease with clearing small hurdles thus progressed to large hurdles   Notes some anterolateral knee soreness with squatting - education to decr squat depth for improved tolerance  Added tap down   Some hip hike on R LE to clear large hurdles      Post Treatment Pain Level (on 0 to 10) scale:  0/ 10     ASSESSMENT  Assessment/Changes in Function:     Patient making good gains with participation in PT services with decr use of brace for support and increased walking tolerance. []  See Progress Note/Recertification   Patient will continue to benefit from skilled PT services to modify and progress therapeutic interventions, address functional mobility deficits, address ROM deficits, address strength deficits, analyze and address soft tissue restrictions, analyze and cue movement patterns, analyze and modify body mechanics/ergonomics, assess and modify postural abnormalities and instruct in home and community integration to attain remaining goals. Progress toward goals / Updated goals:    Patient will 120 degrees of right knee flexion AROM to improve tolerance to ADLs.  Goal in progress +3 to 125 deg (11/21/22) PLAN  [x]  Upgrade activities as tolerated yes Continue plan of care   []  Discharge due to :    []  Other:      Therapist: Romayne Fail    Date: 12/12/2022 Time: 12:24 PM     Future Appointments   Date Time Provider Nabeel Gordon   12/12/2022  3:40 PM Wing Cure [facility]   12/14/2022  2:25 PM Umair Romero [facility]   12/19/2022  2:20 PM Wing Cure [facility]   12/21/2022  2:25 PM [facility]   12/28/2022  2:25 PM Wing [facility]   1/4/2023  2:30 PM Wing Cure [facility]   1/9/2023 11:00 AM Wing Cure [facility]   1/11/2023  2:30 PM Wing [facility]   1/16/2023 11:00 AM Wing Cure [facility]   1/18/2023  2:30 PM Wing [facility]   1/23/2023 11:00 AM Wing Cure [facility]   1/25/2023  2:30 PM Umair Romero [facility]

## 2022-12-14 ENCOUNTER — APPOINTMENT (OUTPATIENT)
Dept: PHYSICAL THERAPY | Age: 22
End: 2022-12-14
Payer: COMMERCIAL

## 2022-12-14 ENCOUNTER — HOSPITAL ENCOUNTER (OUTPATIENT)
Dept: PHYSICAL THERAPY | Age: 22
Discharge: HOME | End: 2022-12-14
Payer: COMMERCIAL

## 2022-12-14 PROCEDURE — 97110 THERAPEUTIC EXERCISES: CPT

## 2022-12-14 NOTE — PROGRESS NOTES
[address], [address],[place], [address] [facility] - Phone: [phone]  Fax: [phone]  [x]  PROGRESS NOTE  []  DISCHARGE SUMMARY  Patient Name: Ange Ledezma : 2000   Treatment Diagnosis: Right knee pain [M25.561]     Referral Source: Malissa Ward,*     Date of Initial Visit: 10/05/2022 Attended Visits: 19 Missed Visits: 0     SUMMARY OF TREATMENT  Therapeutic exercises including ROM, strengthening, stretching, manual therapy including joint and soft tissue manipulation, balance training, modalities: [place], and HEP instruction. CURRENT STATUS  Patient is 12 weeks s/p R ACL Reconstruction (DOS 22). Currently, the patient's main complaint is frequent knee \"crackling\". Denies any episodes for right knee buckling for the past 3-4 weeks, thus, patient has been advised to begin with ambulating short household distances without the brace. Average right knee pain is rated at 0/10 and 4/10 at the worst. Right knee AROM is as follows: flexion 130 deg and extension +3 deg. Able to perform R LE tap down off 2\"step with use of HR for UE support and marked quad shaking. Functional limitations Include: decreased tolerance for prolonged ambulation and inability to kneel. Pt reports improved ability to bend and straighten knee, tolerate prolonged ambulation, negotiate stairs since starting PT services. Pt would benefit from continued PT services in order to improve knee AROM/PROM, strength, flexibility, functional mobility, and address remaining impairments. Goal/Measure of Progress Goal Met? 1. Patient will report pain 3/10 at worst to improve tolerance to ADLs. Status at last Eval: Pain = 10/10 at the worst Current Status: Pain = 3-4/10 at the worst Progressing   2. Patient will 110 degrees of right knee flexion AROM to improve gait.    Status at last Eval: R Knee flexion = 70 deg Current Status: R Knee flexion = 130 yes     New Goals to be achieved in __3-4__  Weeks:  1. Patient will report pain 1/10 at worst to improve tolerance to ADLs. 2.  Patient will be able to perform 6\" tap down x10 repetitions with fair form in order to improve tolerance for repetitive stair navigation. 3.  Patient will increase FOTO Score to 61 points to improve tolerance to ADLs. RECOMMENDATIONS  Continue therapy with the following recommendations: 2x per week for 5-6 weeks    If you have any questions/comments please contact us directly at [phone]   Thank you for allowing us to assist in the care of your patient. Therapist Signature: John Wong DPT Date: 12/14/2022     Time: 11:26 AM     NOTE TO PHYSICIAN:  PLEASE COMPLETE THE ORDERS BELOW AND FAX TO   [facility]:Fax: [phone]  If you are unable to process this request in 24 hours please contact our office: [phone]      ___ I have read the above report and request that my patient continue as recommended.   ___ I have read the above report and request that my patient continue therapy with the following changes/special instructions:_________________________________________________________   ___ I have read the above report and request that my patient be discharged from therapy.      Physician Signature:        Date:       Time:                                       Verónica Sheth

## 2022-12-14 NOTE — PROGRESS NOTES
PHYSICAL THERAPY - DAILY TREATMENT NOTE  Patient Name: Keila Art        Date: 2022  : 2000   yes Patient  Verified  Visit #:   23 of   12 +12 24  Insurance: Payor: Kirby Iraheta / Plan: Mary Schneider / Product Type: HMO /      In time: 2:24 Out time: 304   Total Treatment Time: 39     Medicare/BCBS Time Tracking (below)   Total Timed Codes (min):  39 1:1 Treatment Time: 39     TREATMENT AREA =  Right knee pain [M25.561]    SUBJECTIVE  Pain Level (on 0 to 10 scale):  0  / 10   Medication Changes/New allergies or changes in medical history, any new surgeries or procedures?    no  If yes, update Summary List   Subjective Functional Status/Changes:  []  No changes reported     Denies pain           OBJECTIVE  Modalities Rationale:     decrease pain to improve patient's ability to tolerate prolonged ambulation   min [x] Estim, type/location:                                      []  att     [x]  unatt     []  w/US     [x]  w/ice    []  w/heat    min []  Mechanical Traction: type/lbs                   []  pro   []  sup   []  int   []  cont    []  before manual    []  after manual    min []  Ultrasound, settings/location:      min []  Iontophoresis w/ dexamethasone, location:                                               []  take home patch       []  in clinic   PD min []  Ice     []  Heat    location/position:     min []  Vasopneumatic Device, press/temp:     min []  Other:    [x] Skin assessment post-treatment (if applicable):    [x]  intact    []  redness- no adverse reaction     []redness - adverse reaction:        29 min Therapeutic Exercise:  [x]  See flow sheet   Rationale:      increase ROM and increase strength to improve the patients ability to tolerate prolonged ambulation     NT min Manual Therapy: STM distal hamstring    Rationale:      decrease pain, increase ROM, increase tissue extensibility, decrease edema  and decrease trigger points to improve patient's ability to perform ADLs  The manual therapy interventions were performed at a separate and distinct time from the therapeutic activities interventions. NT min Gait Training:  __30_ feet without 1 crutch wearing brace unlocked on level surfaces with _SBA__ level of assistance; step ups on 8\" step, TM walking   Rationale: To improve ambulation safety and efficiency in order to improve patient's ability to safely ambulate in various environments       Billed With/As:   [] TE   [] TA   [] Neuro   [] Self Care Patient Education: [x] Review HEP    [x] Progressed/Changed HEP based on:   [] positioning   [] body mechanics   [] transfers   [] heat/ice application    [] other:      Other Objective/Functional Measures:    See PN     Post Treatment Pain Level (on 0 to 10) scale:  0/ 10     ASSESSMENT  Assessment/Changes in Function:     See PN     []  See Progress Note/Recertification   Patient will continue to benefit from skilled PT services to modify and progress therapeutic interventions, address functional mobility deficits, address ROM deficits, address strength deficits, analyze and address soft tissue restrictions, analyze and cue movement patterns, analyze and modify body mechanics/ergonomics, assess and modify postural abnormalities and instruct in home and community integration to attain remaining goals.    Progress toward goals / Updated goals:    See PN       PLAN  [x]  Upgrade activities as tolerated yes Continue plan of care   []  Discharge due to :    []  Other:      Therapist: Adalgisa Galan    Date: 12/14/2022 Time: 12:24 PM     Future Appointments   Date Time Provider Nabeel Gordon   12/14/2022  2:25 PM Wilhelmena  [facility]   12/19/2022  2:20 PM Wilhelmena  [facility]   12/21/2022  2:25 PM Wilhelmena  [facility]   12/28/2022  2:25 PM Wilhelmena  [facility]   1/4/2023  2:30 PM Cynthia Romero   1/9/2023 11:00 AM Wilhelmena  [facility]   1/11/2023  2:30 PM Heather Doris Certain [facility]   1/16/2023 11:00 AM [facility]   1/18/2023  2:30 PM Korina Kee [facility]   1/23/2023 11:00 AM Korina Kee [facility]   1/25/2023  2:30 PM Mu Romero Monday [facility]

## 2022-12-19 ENCOUNTER — HOSPITAL ENCOUNTER (OUTPATIENT)
Dept: PHYSICAL THERAPY | Age: 22
End: 2022-12-19
Payer: COMMERCIAL

## 2022-12-19 ENCOUNTER — TELEPHONE (OUTPATIENT)
Dept: PHYSICAL THERAPY | Age: 22
End: 2022-12-19

## 2022-12-19 NOTE — PROGRESS NOTES
PHYSICAL THERAPY - DAILY TREATMENT NOTE  Patient Name: Antolin Madrigal        Date: 2022  : 2000   yes Patient  Verified  Visit #:   20 of   12 +12 24  Insurance: Payor: Morteza Thompson / Plan: Mirna Pizarro / Product Type: HMO /      In time: *** Out time: ***   Total Treatment Time: ***     Medicare/[employer] Time Tracking (below)   Total Timed Codes (min):  *** 1:1 Treatment Time: ***     TREATMENT AREA =  Right knee pain [M25.561]    SUBJECTIVE  Pain Level (on 0 to 10 scale):  ***  / 10   Medication Changes/New allergies or changes in medical history, any new surgeries or procedures?    no  If yes, update Summary List   Subjective Functional Status/Changes:  []  No changes reported     ***          OBJECTIVE  Modalities Rationale:     decrease pain to improve patient's ability to tolerate prolonged ambulation   min [x] Estim, type/location:                                      []  att     [x]  unatt     []  w/US     [x]  w/ice    []  w/heat    min []  Mechanical Traction: type/lbs                   []  pro   []  sup   []  int   []  cont    []  before manual    []  after manual    min []  Ultrasound, settings/location:      min []  Iontophoresis w/ dexamethasone, location:                                               []  take home patch       []  in clinic   PD min []  Ice     []  Heat    location/position:     min []  Vasopneumatic Device, press/temp:     min []  Other:    [x] Skin assessment post-treatment (if applicable):    [x]  intact    []  redness- no adverse reaction     []redness - adverse reaction:        *** min Therapeutic Exercise:  [x]  See flow sheet   Rationale:      increase ROM and increase strength to improve the patients ability to tolerate prolonged ambulation     NT min Manual Therapy: STM distal hamstring    Rationale:      decrease pain, increase ROM, increase tissue extensibility, decrease edema  and decrease trigger points to improve patient's ability to perform ADLs  The manual therapy interventions were performed at a separate and distinct time from the therapeutic activities interventions. NT min Gait Training:  __30_ feet without 1 crutch wearing brace unlocked on level surfaces with _SBA__ level of assistance; step ups on 8\" step, TM walking   Rationale: To improve ambulation safety and efficiency in order to improve patient's ability to safely ambulate in various environments       Billed With/As:   [] TE   [] TA   [] Neuro   [] Self Care Patient Education: [x] Review HEP    [x] Progressed/Changed HEP based on:   [] positioning   [] body mechanics   [] transfers   [] heat/ice application    [] other:      Other Objective/Functional Measures:    See PN     Post Treatment Pain Level (on 0 to 10) scale:  0/ 10     ASSESSMENT  Assessment/Changes in Function:     See PN     []  See Progress Note/Recertification   Patient will continue to benefit from skilled PT services to modify and progress therapeutic interventions, address functional mobility deficits, address ROM deficits, address strength deficits, analyze and address soft tissue restrictions, analyze and cue movement patterns, analyze and modify body mechanics/ergonomics, assess and modify postural abnormalities and instruct in home and community integration to attain remaining goals.    Progress toward goals / Updated goals:    See PN       PLAN  [x]  Upgrade activities as tolerated yes Continue plan of care   []  Discharge due to :    []  Other:      Therapist: Lara Townsend    Date: 12/19/2022 Time: 12:24 PM     Future Appointments   Date Time Provider Nabeel Gordon   12/19/2022  2:20 PM Rebeca Bedolla [facility]   12/21/2022  2:25 PM Rebeca Lawlaura [facility]   12/28/2022  2:25 PM Rebeca Lawn [facility]   1/4/2023  2:30 PM Rebeca Lawlaura [facility]   1/9/2023 11:00 AM Rebeca Astudillo [address]   1/11/2023  2:30 PM Fahad Romero   1/16/2023 11:00 AM Kerrie Romero [address] [facility]   1/18/2023  2:30 PM Raad Abel [address] [facility]   1/23/2023 11:00 AM Raad Abel [address] [facility]   1/25/2023  2:30 PM Michael Romero [address] [facility]

## 2022-12-21 ENCOUNTER — APPOINTMENT (OUTPATIENT)
Dept: PHYSICAL THERAPY | Age: 22
End: 2022-12-21
Payer: COMMERCIAL

## 2022-12-28 ENCOUNTER — HOSPITAL ENCOUNTER (OUTPATIENT)
Dept: PHYSICAL THERAPY | Age: 22
Discharge: HOME | End: 2022-12-28
Payer: COMMERCIAL

## 2022-12-28 PROCEDURE — 97110 THERAPEUTIC EXERCISES: CPT

## 2022-12-28 NOTE — PROGRESS NOTES
PHYSICAL THERAPY - DAILY TREATMENT NOTE  Patient Name: Vika Tiwari        Date: 2022  : 2000   yes Patient  Verified  Visit #:   20 of   (12 +12 24  Insurance: Payor: Hyun Guard / Plan: Juan Miguel William / Product Type: HMO /      In time: 2:23 Out time: 3:02   Total Treatment Time: 39     Medicare/[employer]BS Time Tracking (below)   Total Timed Codes (min):  39 1:1 Treatment Time: 39     TREATMENT AREA =  Right knee pain [M25.561]    SUBJECTIVE  Pain Level (on 0 to 10 scale):  0  / 10   Medication Changes/New allergies or changes in medical history, any new surgeries or procedures?    no  If yes, update Summary List   Subjective Functional Status/Changes:  []  No changes reported     I start working next week.  He said no more brace or crutches - keep working on strengthening; everything else looks good          OBJECTIVE  Modalities Rationale:     decrease pain to improve patient's ability to tolerate prolonged ambulation   min [x] Estim, type/location:                                      []  att     [x]  unatt     []  w/US     [x]  w/ice    []  w/heat    min []  Mechanical Traction: type/lbs                   []  pro   []  sup   []  int   []  cont    []  before manual    []  after manual    min []  Ultrasound, settings/location:      min []  Iontophoresis w/ dexamethasone, location:                                               []  take home patch       []  in clinic   PD min []  Ice     []  Heat    location/position:     min []  Vasopneumatic Device, press/temp:     min []  Other:    [x] Skin assessment post-treatment (if applicable):    [x]  intact    []  redness- no adverse reaction     []redness - adverse reaction:        39 min Therapeutic Exercise:  [x]  See flow sheet   Rationale:      increase ROM and increase strength to improve the patients ability to tolerate prolonged ambulation     Billed With/As:   [] TE   [] TA   [] Neuro   [] Self Care Patient Education: [x] Review HEP    [x] Progressed/Changed HEP based on:   [] positioning   [] body mechanics   [] transfers   [] heat/ice application    [] other:      Other Objective/Functional Measures:    Progressed step up height to 12\" with increased challenge   Addition of mini band to squat - however requires max cuing for proper form and decreasign depth of squat   Progressed resistance of LP with increased challenge      Post Treatment Pain Level (on 0 to 10) scale:  0/ 10     ASSESSMENT  Assessment/Changes in Function:     Discussed decreased frequncy to 1 day per week as patient returns to work and with coming of the new year copay. []  See Progress Note/Recertification   Patient will continue to benefit from skilled PT services to modify and progress therapeutic interventions, address functional mobility deficits, address ROM deficits, address strength deficits, analyze and address soft tissue restrictions, analyze and cue movement patterns, analyze and modify body mechanics/ergonomics, assess and modify postural abnormalities and instruct in home and community integration to attain remaining goals. Progress toward goals / Updated goals:    1. Patient will report pain 1/10 at worst to improve tolerance to ADLs. 2.  Patient will be able to perform 6\" tap down x10 repetitions with fair form in order to improve tolerance for repetitive stair navigation. Goal in progress R LE tap down from 2\" step 2x10  min valgus; marked shaking with descent (12/28/22)   3. Patient will increase FOTO Score to 61 points to improve tolerance to ADLs.         PLAN  [x]  Upgrade activities as tolerated yes Continue plan of care   []  Discharge due to :    []  Other:      Therapist: Ivy Courser    Date: 12/28/2022 Time: 12:24 PM     Future Appointments   Date Time Provider Nabeel Gordon   12/28/2022  2:25 PM Félix Martel [facility]   1/4/2023  2:30 PM Félix Martel [facility]   1/9/2023 11:00 AM Sydnee Romero [address] 1/11/2023  2:30 PM Kathy Limb [facility]   1/16/2023 11:00 AM [employer] [facility] [address]   1/18/2023  2:30 PM Kathy Limb [address] [facility]   1/23/2023 11:00 AM [employer] [facility] [address] [facility]   1/25/2023  2:30 PM India Romero [address] [facility]

## 2023-01-04 ENCOUNTER — APPOINTMENT (OUTPATIENT)
Dept: PHYSICAL THERAPY | Age: 23
End: 2023-01-04
Payer: COMMERCIAL

## 2023-01-09 ENCOUNTER — HOSPITAL ENCOUNTER (OUTPATIENT)
Dept: PHYSICAL THERAPY | Age: 23
Discharge: HOME | End: 2023-01-09
Payer: COMMERCIAL

## 2023-01-09 PROCEDURE — 97110 THERAPEUTIC EXERCISES: CPT

## 2023-01-09 NOTE — PROGRESS NOTES
PHYSICAL THERAPY - DAILY TREATMENT NOTE  Patient Name: Redd Lowry        Date: 2023  : 2000   yes Patient  Verified  Visit #:   21 of   12 +12 24  Insurance: Payor: Nino Thomas / Plan: Peter Isaac / Product Type: HMO /      In time: 067 Out time: 4:31   Total Treatment Time: 36     Medicare/[facility] Time Tracking (below)   Total Timed Codes (min):  36 1:1 Treatment Time: 31     TREATMENT AREA =  Right knee pain [M25.561]    SUBJECTIVE  Pain Level (on 0 to 10 scale):  0  / 10   Medication Changes/New allergies or changes in medical history, any new surgeries or procedures?    no  If yes, update Summary List   Subjective Functional Status/Changes:  []  No changes reported     Notes work has been challenging - pain Friday. My knee still cracks like crazy - he said its the scar tissue and cartilge.  Sometimes bucklyes backwards         OBJECTIVE  Modalities Rationale:     decrease pain to improve patient's ability to tolerate prolonged ambulation   min [x] Estim, type/location:                                      []  att     [x]  unatt     []  w/US     [x]  w/ice    []  w/heat    min []  Mechanical Traction: type/lbs                   []  pro   []  sup   []  int   []  cont    []  before manual    []  after manual    min []  Ultrasound, settings/location:      min []  Iontophoresis w/ dexamethasone, location:                                               []  take home patch       []  in clinic   PD min []  Ice     []  Heat    location/position:     min []  Vasopneumatic Device, press/temp:     min []  Other:    [x] Skin assessment post-treatment (if applicable):    [x]  intact    []  redness- no adverse reaction     []redness - adverse reaction:        36 (bill 31) min Therapeutic Exercise:  [x]  See flow sheet   Rationale:      increase ROM and increase strength to improve the patients ability to tolerate prolonged ambulation     Billed With/As:   [] TE   [] TA   [] Neuro   [] Self Care Patient Education: [x] Review HEP    [x] Progressed/Changed HEP based on:   [] positioning   [] body mechanics   [] transfers   [] heat/ice application    [] other:      Other Objective/Functional Measures:    R knee shaking with eccentric lowering and slight weight shift to the left   Added TRX lunge - max cues for proper performance      Post Treatment Pain Level (on 0 to 10) scale:  0/ 10     ASSESSMENT  Assessment/Changes in Function:     R knee very fatigued with therex secondary to decreased sessions to 1x per week and return to work. []  See Progress Note/Recertification   Patient will continue to benefit from skilled PT services to modify and progress therapeutic interventions, address functional mobility deficits, address ROM deficits, address strength deficits, analyze and address soft tissue restrictions, analyze and cue movement patterns, analyze and modify body mechanics/ergonomics, assess and modify postural abnormalities and instruct in home and community integration to attain remaining goals. Progress toward goals / Updated goals:    1. Patient will report pain 1/10 at worst to improve tolerance to ADLs. 2.  Patient will be able to perform 6\" tap down x10 repetitions with fair form in order to improve tolerance for repetitive stair navigation. Goal in progress R LE tap down from 2\" step 2x10  min valgus; marked shaking with descent (12/28/22)   3. Patient will increase FOTO Score to 61 points to improve tolerance to ADLs.         PLAN  [x]  Upgrade activities as tolerated yes Continue plan of care   []  Discharge due to :    []  Other:      Therapist: Zoya Monroy    Date: 1/9/2023 Time: 12:24 PM     Future Appointments   Date Time Provider Nabeel Gordon   1/9/2023  4:00 PM Sandy Silva [facility]   1/16/2023  5:30 PM Stephanie Vera [address] [facility]   1/23/2023  5:30 PM Stephanie Vera [facility]   1/30/2023  5:30 PM Chanel Romero [facility]

## 2023-01-11 ENCOUNTER — APPOINTMENT (OUTPATIENT)
Dept: PHYSICAL THERAPY | Age: 23
End: 2023-01-11
Payer: COMMERCIAL

## 2023-01-16 ENCOUNTER — HOSPITAL ENCOUNTER (OUTPATIENT)
Dept: PHYSICAL THERAPY | Age: 23
End: 2023-01-16
Payer: COMMERCIAL

## 2023-01-18 ENCOUNTER — APPOINTMENT (OUTPATIENT)
Dept: PHYSICAL THERAPY | Age: 23
End: 2023-01-18
Payer: COMMERCIAL

## 2023-01-25 ENCOUNTER — APPOINTMENT (OUTPATIENT)
Dept: PHYSICAL THERAPY | Age: 23
End: 2023-01-25
Payer: COMMERCIAL

## 2023-01-30 ENCOUNTER — APPOINTMENT (OUTPATIENT)
Dept: PHYSICAL THERAPY | Age: 23
End: 2023-01-30
Payer: COMMERCIAL

## 2023-02-09 ENCOUNTER — HOSPITAL ENCOUNTER (OUTPATIENT)
Dept: PHYSICAL THERAPY | Age: 23
Discharge: HOME | End: 2023-02-09
Payer: COMMERCIAL

## 2023-02-09 PROCEDURE — 97110 THERAPEUTIC EXERCISES: CPT

## 2023-02-09 NOTE — PROGRESS NOTES
PHYSICAL THERAPY - DAILY TREATMENT NOTE  Patient Name: Bud Gomes        Date: 2023  : 2000   yes Patient  Verified  Visit #:   25 of   (12 +12) 24  Insurance: Payor: Luis Matias / Plan: Carlyn Arnold / Product Type: HMO /      In time: 321 Out time: 9:27   Total Treatment Time: 28     Medicare/[employer] Time Tracking (below)   Total Timed Codes (min):  28 1:1 Treatment Time: 28     TREATMENT AREA =  Right knee pain [M25.561]    SUBJECTIVE  Pain Level (on 0 to 10 scale):  0  / 10   Medication Changes/New allergies or changes in medical history, any new surgeries or procedures?    no  If yes, update Summary List   Subjective Functional Status/Changes:  []  No changes reported     Pretty good - been going to [facility]; have appt next week with docto            OBJECTIVE  Modalities Rationale:     decrease pain to improve patient's ability to tolerate prolonged ambulation   min [x] Estim, type/location:                                      []  att     [x]  unatt     []  w/US     [x]  w/ice    []  w/heat    min []  Mechanical Traction: type/lbs                   []  pro   []  sup   []  int   []  cont    []  before manual    []  after manual    min []  Ultrasound, settings/location:      min []  Iontophoresis w/ dexamethasone, location:                                               []  take home patch       []  in clinic   PD min []  Ice     []  Heat    location/position:     min []  Vasopneumatic Device, press/temp:     min []  Other:    [x] Skin assessment post-treatment (if applicable):    [x]  intact    []  redness- no adverse reaction     []redness - adverse reaction:        28   min Therapeutic Exercise:  [x]  See flow sheet   Rationale:      increase ROM and increase strength to improve the patients ability to tolerate prolonged ambulation     Billed With/As:   [] TE   [] TA   [] Neuro   [] Self Care Patient Education: [x] Review HEP    [x] Progressed/Changed HEP based on:   [] positioning [] body mechanics   [] transfers   [] heat/ice application    [] other:      Other Objective/Functional Measures:    See DC     Post Treatment Pain Level (on 0 to 10) scale:  0/ 10     ASSESSMENT  Assessment/Changes in Function:     See DC     []  See Progress Note/Recertification   Patient will continue to benefit from skilled PT services to modify and progress therapeutic interventions, address functional mobility deficits, address ROM deficits, address strength deficits, analyze and address soft tissue restrictions, analyze and cue movement patterns, analyze and modify body mechanics/ergonomics, assess and modify postural abnormalities and instruct in home and community integration to attain remaining goals.    Progress toward goals / Updated goals:    See DC     PLAN  [x]  Upgrade activities as tolerated yes Continue plan of care   []  Discharge due to :    []  Other:      Therapist: Alysha Banks    Date: 2/9/2023 Time: 12:24 PM     Future Appointments   Date Time Provider Nabeel Gordon   2/9/2023  9:00 AM Ashley Daniels

## 2023-02-09 NOTE — PROGRESS NOTES
Jose G, [facility] [address],[facility], [address] [facility] - Phone: [phone]  Fax: [phone]  [x]  PROGRESS NOTE  []  DISCHARGE SUMMARY  Patient Name: Slim Brandon : 2000   Treatment Diagnosis: Right knee pain [M25.561]     Referral Source: Tao oRldan,*     Date of Initial Visit: 10/05/2022 Attended Visits: 22 Missed Visits: 0     SUMMARY OF TREATMENT  Therapeutic exercises including ROM, strengthening, stretching, manual therapy including joint and soft tissue manipulation, balance training, modalities: [place], and HEP instruction. CURRENT STATUS  Patient is 20 weeks s/p R ACL Reconstruction (DOS 22). Denies any episodes for right knee buckling for the past 3-4 weeks. Chief complaint is soreness in the right knee with jogging and recreational activity. Average right knee pain is rated at 0/10 and 1-2/10 at the worst. Patient is able to squat with 6kg KB, perform 6' tap down, and perform TRX lunge. Based on progress with PT interventions patient is appropriate for DC from PT services with continuation of HEP at gym. Goal/Measure of Progress Goal Met? 1. Patient will report pain 3/10 at worst to improve tolerance to ADLs. Status at last Eval: Pain = 10/10 at the worst Current Status: Pain = 1-2/10 at the worst yes   2. Patient will be able to perform 6\" tap down x10 repetitions with fair form in order to improve tolerance for repetitive stair navigation. Status at last Eval: R Knee tap down 4\" Current Status: R Knee tap down off 6\" 2x10 yes   3. Patient will increase FOTO Score to 61 points to improve tolerance to ADLs. FOTO = 33/100 Current Status: FOTO = 90/100 yes     RECOMMENDATIONS  Discontinue therapy. Progressing towards or have reached established goals. If you have any questions/comments please contact us directly at [phone].   Thank you for allowing us to assist in the care of your patient.     Therapist Signature: Severino Romero Date: 2/9/2023      Time: 9:50 AM